# Patient Record
Sex: FEMALE | Race: BLACK OR AFRICAN AMERICAN | Employment: FULL TIME | URBAN - METROPOLITAN AREA
[De-identification: names, ages, dates, MRNs, and addresses within clinical notes are randomized per-mention and may not be internally consistent; named-entity substitution may affect disease eponyms.]

---

## 2017-07-18 PROBLEM — D21.9 LEIOMYOMA: Status: ACTIVE | Noted: 2017-07-18

## 2020-03-04 PROBLEM — I16.9 HYPERTENSIVE CRISIS: Status: ACTIVE | Noted: 2020-03-04

## 2021-04-17 ENCOUNTER — APPOINTMENT (OUTPATIENT)
Dept: CT IMAGING | Age: 41
DRG: 052 | End: 2021-04-17
Attending: EMERGENCY MEDICINE
Payer: MEDICAID

## 2021-04-17 ENCOUNTER — HOSPITAL ENCOUNTER (INPATIENT)
Age: 41
LOS: 2 days | Discharge: HOME | DRG: 052 | End: 2021-04-19
Attending: EMERGENCY MEDICINE | Admitting: STUDENT IN AN ORGANIZED HEALTH CARE EDUCATION/TRAINING PROGRAM
Payer: MEDICAID

## 2021-04-17 ENCOUNTER — APPOINTMENT (OUTPATIENT)
Dept: CT IMAGING | Age: 41
DRG: 052 | End: 2021-04-17
Attending: STUDENT IN AN ORGANIZED HEALTH CARE EDUCATION/TRAINING PROGRAM
Payer: MEDICAID

## 2021-04-17 DIAGNOSIS — I63.9 CEREBROVASCULAR ACCIDENT (CVA), UNSPECIFIED MECHANISM (HCC): Primary | ICD-10-CM

## 2021-04-17 LAB
ALBUMIN SERPL-MCNC: 3.1 G/DL (ref 3.4–5)
ALBUMIN/GLOB SERPL: 0.8 {RATIO} (ref 0.8–1.7)
ALP SERPL-CCNC: 103 U/L (ref 45–117)
ALT SERPL-CCNC: 56 U/L (ref 13–56)
ANION GAP SERPL CALC-SCNC: 3 MMOL/L (ref 3–18)
ANION GAP SERPL CALC-SCNC: 5 MMOL/L (ref 3–18)
AST SERPL-CCNC: 32 U/L (ref 10–38)
BASOPHILS # BLD: 0 K/UL (ref 0–0.1)
BASOPHILS NFR BLD: 0 % (ref 0–2)
BILIRUB SERPL-MCNC: 0.3 MG/DL (ref 0.2–1)
BUN SERPL-MCNC: 12 MG/DL (ref 7–18)
BUN SERPL-MCNC: 8 MG/DL (ref 7–18)
BUN/CREAT SERPL: 15 (ref 12–20)
BUN/CREAT SERPL: 25 (ref 12–20)
CALCIUM SERPL-MCNC: 9.2 MG/DL (ref 8.5–10.1)
CALCIUM SERPL-MCNC: 9.4 MG/DL (ref 8.5–10.1)
CHLORIDE SERPL-SCNC: 107 MMOL/L (ref 100–111)
CHLORIDE SERPL-SCNC: 109 MMOL/L (ref 100–111)
CO2 SERPL-SCNC: 25 MMOL/L (ref 21–32)
CO2 SERPL-SCNC: 28 MMOL/L (ref 21–32)
CREAT SERPL-MCNC: 0.48 MG/DL (ref 0.6–1.3)
CREAT SERPL-MCNC: 0.53 MG/DL (ref 0.6–1.3)
DIFFERENTIAL METHOD BLD: ABNORMAL
EOSINOPHIL # BLD: 0.3 K/UL (ref 0–0.4)
EOSINOPHIL NFR BLD: 3 % (ref 0–5)
ERYTHROCYTE [DISTWIDTH] IN BLOOD BY AUTOMATED COUNT: 18.3 % (ref 11.6–14.5)
ERYTHROCYTE [DISTWIDTH] IN BLOOD BY AUTOMATED COUNT: 18.6 % (ref 11.6–14.5)
GLOBULIN SER CALC-MCNC: 3.9 G/DL (ref 2–4)
GLUCOSE SERPL-MCNC: 101 MG/DL (ref 74–99)
GLUCOSE SERPL-MCNC: 90 MG/DL (ref 74–99)
HCG SERPL QL: NEGATIVE
HCT VFR BLD AUTO: 34.9 % (ref 35–45)
HCT VFR BLD AUTO: 37.5 % (ref 35–45)
HGB BLD-MCNC: 10.1 G/DL (ref 12–16)
HGB BLD-MCNC: 11.2 G/DL (ref 12–16)
INR PPP: 1 (ref 0.8–1.2)
LYMPHOCYTES # BLD: 3.6 K/UL (ref 0.9–3.6)
LYMPHOCYTES NFR BLD: 40 % (ref 21–52)
MCH RBC QN AUTO: 20.3 PG (ref 24–34)
MCH RBC QN AUTO: 20.7 PG (ref 24–34)
MCHC RBC AUTO-ENTMCNC: 28.9 G/DL (ref 31–37)
MCHC RBC AUTO-ENTMCNC: 29.9 G/DL (ref 31–37)
MCV RBC AUTO: 69.2 FL (ref 74–97)
MCV RBC AUTO: 70.2 FL (ref 74–97)
MONOCYTES # BLD: 0.6 K/UL (ref 0.05–1.2)
MONOCYTES NFR BLD: 7 % (ref 3–10)
NEUTS SEG # BLD: 4.5 K/UL (ref 1.8–8)
NEUTS SEG NFR BLD: 50 % (ref 40–73)
PHOSPHATE SERPL-MCNC: 5.2 MG/DL (ref 2.5–4.9)
PLATELET # BLD AUTO: 392 K/UL (ref 135–420)
PLATELET # BLD AUTO: 402 K/UL (ref 135–420)
PLATELET COMMENTS,PCOM: ABNORMAL
PMV BLD AUTO: 10 FL (ref 9.2–11.8)
PMV BLD AUTO: 9.9 FL (ref 9.2–11.8)
POTASSIUM SERPL-SCNC: 3.7 MMOL/L (ref 3.5–5.5)
POTASSIUM SERPL-SCNC: 3.9 MMOL/L (ref 3.5–5.5)
PROT SERPL-MCNC: 7 G/DL (ref 6.4–8.2)
PROTHROMBIN TIME: 13 SEC (ref 11.5–15.2)
RBC # BLD AUTO: 4.97 M/UL (ref 4.2–5.3)
RBC # BLD AUTO: 5.42 M/UL (ref 4.2–5.3)
RBC MORPH BLD: ABNORMAL
SODIUM SERPL-SCNC: 137 MMOL/L (ref 136–145)
SODIUM SERPL-SCNC: 140 MMOL/L (ref 136–145)
TROPONIN I SERPL-MCNC: <0.02 NG/ML (ref 0–0.04)
WBC # BLD AUTO: 9 K/UL (ref 4.6–13.2)
WBC # BLD AUTO: 9.3 K/UL (ref 4.6–13.2)

## 2021-04-17 PROCEDURE — 74011000250 HC RX REV CODE- 250: Performed by: EMERGENCY MEDICINE

## 2021-04-17 PROCEDURE — 85027 COMPLETE CBC AUTOMATED: CPT

## 2021-04-17 PROCEDURE — 84100 ASSAY OF PHOSPHORUS: CPT

## 2021-04-17 PROCEDURE — 70450 CT HEAD/BRAIN W/O DYE: CPT

## 2021-04-17 PROCEDURE — 70498 CT ANGIOGRAPHY NECK: CPT

## 2021-04-17 PROCEDURE — 74011000636 HC RX REV CODE- 636: Performed by: STUDENT IN AN ORGANIZED HEALTH CARE EDUCATION/TRAINING PROGRAM

## 2021-04-17 PROCEDURE — 65270000029 HC RM PRIVATE

## 2021-04-17 PROCEDURE — 80053 COMPREHEN METABOLIC PANEL: CPT

## 2021-04-17 PROCEDURE — 65610000006 HC RM INTENSIVE CARE

## 2021-04-17 PROCEDURE — 3E03317 INTRODUCTION OF OTHER THROMBOLYTIC INTO PERIPHERAL VEIN, PERCUTANEOUS APPROACH: ICD-10-PCS | Performed by: EMERGENCY MEDICINE

## 2021-04-17 PROCEDURE — 85025 COMPLETE CBC W/AUTO DIFF WBC: CPT

## 2021-04-17 PROCEDURE — 74011250636 HC RX REV CODE- 250/636: Performed by: EMERGENCY MEDICINE

## 2021-04-17 PROCEDURE — 93005 ELECTROCARDIOGRAM TRACING: CPT

## 2021-04-17 PROCEDURE — 85610 PROTHROMBIN TIME: CPT

## 2021-04-17 PROCEDURE — 84484 ASSAY OF TROPONIN QUANT: CPT

## 2021-04-17 PROCEDURE — 84703 CHORIONIC GONADOTROPIN ASSAY: CPT

## 2021-04-17 PROCEDURE — 37195 THROMBOLYTIC THERAPY STROKE: CPT

## 2021-04-17 PROCEDURE — 94762 N-INVAS EAR/PLS OXIMTRY CONT: CPT

## 2021-04-17 PROCEDURE — 74011250637 HC RX REV CODE- 250/637: Performed by: EMERGENCY MEDICINE

## 2021-04-17 PROCEDURE — 99285 EMERGENCY DEPT VISIT HI MDM: CPT

## 2021-04-17 RX ORDER — SODIUM CHLORIDE 0.9 % (FLUSH) 0.9 %
5-40 SYRINGE (ML) INJECTION EVERY 8 HOURS
Status: DISCONTINUED | OUTPATIENT
Start: 2021-04-17 | End: 2021-04-19 | Stop reason: HOSPADM

## 2021-04-17 RX ORDER — ATORVASTATIN CALCIUM 40 MG/1
80 TABLET, FILM COATED ORAL
Status: DISCONTINUED | OUTPATIENT
Start: 2021-04-17 | End: 2021-04-19 | Stop reason: HOSPADM

## 2021-04-17 RX ORDER — SODIUM CHLORIDE 0.9 % (FLUSH) 0.9 %
5-40 SYRINGE (ML) INJECTION AS NEEDED
Status: DISCONTINUED | OUTPATIENT
Start: 2021-04-17 | End: 2021-04-19 | Stop reason: HOSPADM

## 2021-04-17 RX ORDER — ACETAMINOPHEN 500 MG
1000 TABLET ORAL
Status: COMPLETED | OUTPATIENT
Start: 2021-04-17 | End: 2021-04-17

## 2021-04-17 RX ORDER — POLYETHYLENE GLYCOL 3350 17 G/17G
17 POWDER, FOR SOLUTION ORAL DAILY PRN
Status: DISCONTINUED | OUTPATIENT
Start: 2021-04-17 | End: 2021-04-19 | Stop reason: HOSPADM

## 2021-04-17 RX ORDER — DEXTROSE 50 % IN WATER (D50W) INTRAVENOUS SYRINGE
12.5
Status: ACTIVE | OUTPATIENT
Start: 2021-04-17 | End: 2021-04-18

## 2021-04-17 RX ORDER — ACETAMINOPHEN 325 MG/1
650 TABLET ORAL
Status: DISCONTINUED | OUTPATIENT
Start: 2021-04-17 | End: 2021-04-19 | Stop reason: HOSPADM

## 2021-04-17 RX ORDER — ACETAMINOPHEN 650 MG/1
650 SUPPOSITORY RECTAL
Status: DISCONTINUED | OUTPATIENT
Start: 2021-04-17 | End: 2021-04-19 | Stop reason: HOSPADM

## 2021-04-17 RX ADMIN — ALTEPLASE 9 MG: KIT at 18:36

## 2021-04-17 RX ADMIN — ALTEPLASE 81 MG: KIT at 18:38

## 2021-04-17 RX ADMIN — IOPAMIDOL 80 ML: 755 INJECTION, SOLUTION INTRAVENOUS at 20:58

## 2021-04-17 RX ADMIN — ACETAMINOPHEN 1000 MG: 500 TABLET ORAL at 19:30

## 2021-04-17 NOTE — Clinical Note
Status[de-identified] INPATIENT [101]   Type of Bed: Stepdown [17]   Inpatient Hospitalization Certified Necessary for the Following Reasons: 3.  Patient receiving treatment that can only be provided in an inpatient setting (further clarification in H&P documentation)   Admitting Diagnosis: CVA (cerebral vascular accident) [facility]) [[id number]]   Admitting Physician: Jose Alberto Guy [[id number]]   Attending Physician: Cassius Mohamud   Estimated Length of Stay: 3-4 Midnights   Discharge Plan[de-identified] Home with Office Follow-up

## 2021-04-17 NOTE — ED PROVIDER NOTES
EMERGENCY DEPARTMENT HISTORY AND PHYSICAL EXAM    5:47 PM crowding in the ER patient seen on EMS stretcher to expedite care      Date: 2021  Patient Name: Sarah Sousa    History of Presenting Illness     Chief Complaint   Patient presents with    Hypertension    Extremity Weakness         History Provided By: patient    Additional History (Context): Sarah Sousa is a 47 y.o. female presents with history of hypertension and noncompliant with her meds, about an hour and 15 minutes ago while eating crabs developed left upper extremity numbness, she has had this syndrome before also has blurry vision. She also notes headache. Peggy Thao PCP: Devera Hammans, DO    Chief Complaint:   Duration:    Timing:    Location:   Quality:   Severity:   Modifying Factors:   Associated Symptoms:       Current Outpatient Medications   Medication Sig Dispense Refill    amLODIPine (NORVASC) 10 mg tablet Take 1 Tab by mouth daily. 30 Tab 0    metoprolol tartrate (LOPRESSOR) 50 mg tablet Take 1 Tab by mouth every twelve (12) hours. 30 Tab 0       Past History     Past Medical History:  Past Medical History:   Diagnosis Date    Anemia, iron deficiency     Generalized headaches     Hypertension     NO CURRENT MEDS    Leiomyoma of uterus, unspecified        Past Surgical History:  Past Surgical History:   Procedure Laterality Date    HX  SECTION      times 2    HX CHOLECYSTECTOMY      HX TUBAL LIGATION  2009    IR OCCL TXCATH ORGAN W SI         Family History:  Family History   Problem Relation Age of Onset    Diabetes Father     Hypertension Father     Diabetes Maternal Grandmother        Social History:  Social History     Tobacco Use    Smoking status: Never Smoker    Smokeless tobacco: Never Used   Substance Use Topics    Alcohol use: Yes    Drug use: No       Allergies:  No Known Allergies      Review of Systems     Review of Systems   Constitutional: Negative for diaphoresis and fever.    HENT: Negative for congestion and sore throat. Eyes: Negative for pain and itching. Respiratory: Negative for cough and shortness of breath. Cardiovascular: Negative for chest pain and palpitations. Gastrointestinal: Negative for abdominal pain and diarrhea. Endocrine: Negative for polydipsia and polyuria. Genitourinary: Negative for dysuria and hematuria. Musculoskeletal: Negative for arthralgias and myalgias. Skin: Negative for rash and wound. Neurological: Positive for numbness. Negative for seizures and syncope. Hematological: Does not bruise/bleed easily. Psychiatric/Behavioral: Negative for agitation and hallucinations. Physical Exam       Patient Vitals for the past 12 hrs:   Temp Pulse Resp BP SpO2   04/17/21 1836    (!) 157/101    04/17/21 1757 98.3 °F (36.8 °C) 91 19 (!) 155/110 100 %       Physical Exam  Vitals signs and nursing note reviewed. Constitutional:       Appearance: She is well-developed. HENT:      Head: Normocephalic and atraumatic. Eyes:      General: No scleral icterus. Extraocular Movements: Extraocular movements intact. Conjunctiva/sclera: Conjunctivae normal.      Pupils: Pupils are equal, round, and reactive to light. Neck:      Musculoskeletal: Normal range of motion and neck supple. Vascular: No JVD. Cardiovascular:      Rate and Rhythm: Normal rate and regular rhythm. Heart sounds: Normal heart sounds. Comments: 4 intact extremity pulses  Pulmonary:      Effort: Pulmonary effort is normal.      Breath sounds: Normal breath sounds. Abdominal:      Palpations: Abdomen is soft. There is no mass. Tenderness: There is no abdominal tenderness. Musculoskeletal: Normal range of motion. Lymphadenopathy:      Cervical: No cervical adenopathy. Skin:     General: Skin is warm and dry. Neurological:      Mental Status: She is alert. Cranial Nerves: No cranial nerve deficit.            Diagnostic Study Results Labs -  Recent Results (from the past 12 hour(s))   CBC WITH AUTOMATED DIFF    Collection Time: 04/17/21  5:45 PM   Result Value Ref Range    WBC 9.0 4.6 - 13.2 K/uL    RBC 5.42 (H) 4.20 - 5.30 M/uL    HGB 11.2 (L) 12.0 - 16.0 g/dL    HCT 37.5 35.0 - 45.0 %    MCV 69.2 (L) 74.0 - 97.0 FL    MCH 20.7 (L) 24.0 - 34.0 PG    MCHC 29.9 (L) 31.0 - 37.0 g/dL    RDW 18.6 (H) 11.6 - 14.5 %    PLATELET 436 499 - 455 K/uL    MPV 9.9 9.2 - 11.8 FL    NEUTROPHILS PENDING %    LYMPHOCYTES PENDING %    MONOCYTES PENDING %    EOSINOPHILS PENDING %    BASOPHILS PENDING %    ABS. NEUTROPHILS PENDING K/UL    ABS. LYMPHOCYTES PENDING K/UL    ABS. MONOCYTES PENDING K/UL    ABS. EOSINOPHILS PENDING K/UL    ABS. BASOPHILS PENDING K/UL    DF PENDING    METABOLIC PANEL, BASIC    Collection Time: 04/17/21  5:45 PM   Result Value Ref Range    Sodium 140 136 - 145 mmol/L    Potassium 3.9 3.5 - 5.5 mmol/L    Chloride 109 100 - 111 mmol/L    CO2 28 21 - 32 mmol/L    Anion gap 3 3.0 - 18 mmol/L    Glucose 90 74 - 99 mg/dL    BUN 8 7.0 - 18 MG/DL    Creatinine 0.53 (L) 0.6 - 1.3 MG/DL    BUN/Creatinine ratio 15 12 - 20      GFR est AA >60 >60 ml/min/1.73m2    GFR est non-AA >60 >60 ml/min/1.73m2    Calcium 9.4 8.5 - 10.1 MG/DL   PROTHROMBIN TIME + INR    Collection Time: 04/17/21  5:45 PM   Result Value Ref Range    Prothrombin time 13.0 11.5 - 15.2 sec    INR 1.0 0.8 - 1.2     TROPONIN I    Collection Time: 04/17/21  5:45 PM   Result Value Ref Range    Troponin-I, QT <0.02 0.0 - 0.045 NG/ML       Radiologic Studies -   CT HEAD WO CONT   Final Result   No intracranial hemorrhage or mass effect. Findings discussed with Dr. Robin Molina at 6:05 PM April 17, 2021 by telephone. Ct Head Wo Cont    Result Date: 4/17/2021  CT HEAD WO CONT: 4/17/2021 5:52 PM CLINICAL INFORMATION Left upper extremity weakness. COMPARISON None. TECHNIQUE Axial CT images of the brain acquired. Sagittal and coronal reformations performed.  The following CT dose reduction techniques were utilized: automated exposure control and/or adjustment of the mA and/or kV according to patient size, and the use of iterative reconstruction technique FINDINGS INTRA-AXIAL STRUCTURES Cerebral cortex: No evidence of intracranial mass or hemorrhage. The gray-white differentiation is maintained. Normal attenuation of the cerebral cortex. Normal gray-white differentiation. Ventricular system: Normal in size and morphology for the patient's age. Midline shift: No evidence of midline shift. Sella: The sella turcica and hypothalamic region are within normal limits. Cerebellum: The cerebellum is normal in appearance. Normal gray-white differentiation. Brainstem: The brainstem is normal in appearance. The prepontine cistern is within normal limits. Normal gray-white differentiation. EXTRA-AXIAL STRUCTURES Normal in size and morphology for the patient's age. VASCULATURE Normal. EXTRACRANIAL SOFT TISSUE STRUCTURES Orbits: The orbits and globes are within normal limits. Sinuses: Patent paranasal sinuses and mastoid air cells bilaterally. Visualized upper cervical spine: Normal. OSSEOUS STRUCTURES No evidence of acute fracture. No intracranial hemorrhage or mass effect. Findings discussed with Dr. Grazyna Meyer at 6:05 PM April 17, 2021 by telephone. Medications ordered:   Medications   alteplase (ACTIVASE) bolus dose (0 mg IntraVENous IV Completed 4/17/21 1837)   alteplase (ACTIVASE) infusion 81 mg (81 mg IntraVENous Given 4/17/21 1838)         Medical Decision Making   Initial Medical Decision Making and DDx:   possible stroke, migraine, hypertensive encephalopathy less likely with a pressure 150, pseudotumor. ED Course: Progress Notes, Reevaluation, and Consults:    5:49 PM d/w Dr Gerhardt Simmers teleneurology discussed: 15 minutes of symptoms, left upper extremity numbness blurry vision and some of her strength or control problems with her left upper extremity.   ED Course as of Apr 17 1840   Sat Apr 17, 2021   1805 Appreciate call back from radiology, nothing acute on head CT.    [CB]   1815 D/w Dr Rommel Harden, teleneurologist, he saw and evaluated the patient reviewed films, noted some mild hypotension, thinks may be a a superficial right parietal area stroke could cause this and patient is low risk of giving TPA so he recommends that. He called back, had informed discussion with the patient and family and the patient accepts the TPA. I have entered orders. [CB]   0848 Discussed with Dr. Meza Leader intensivist to admit the patient since she is getting TPA. Dr. Meza Leader at bedside to evaluate. 1.5 hour critical care time management of acute stroke coordination of care data analysis documentation reassessment. [CB]      ED Course User Index  [CB] Chris Cedeño MD     6:42 PM reassessed the patient, will encroachment his good function of the left upper extremity. She is hesitant to do full active range of motion but does with assistance. Good muscle tone. I am the first provider for this patient. I reviewed the vital signs, available nursing notes, past medical history, past surgical history, family history and social history. Patient Vitals for the past 12 hrs:   Temp Pulse Resp BP SpO2   04/17/21 1836    (!) 157/101    04/17/21 1757 98.3 °F (36.8 °C) 91 19 (!) 155/110 100 %       Vital Signs-Reviewed the patient's vital signs. Pulse Oximetry Analysis, Cardiac Monitor, 12 lead ekg: No hypoxia on room air  Interpreted by the EP. Records Reviewed: Nursing notes reviewed (Time of Review: 5:47 PM)    Procedures:   Critical Care Time:   Aspirin: (was aspirin given for stroke?)    Diagnosis     Clinical Impression:   1.  Cerebrovascular accident (CVA), unspecified mechanism ([facility] [address]        Disposition: Admitted      Follow-up Information    None          Patient's Medications   Start Taking    No medications on file   Continue Taking    AMLODIPINE (NORVASC) 10 MG TABLET    Take 1 Tab by mouth daily. METOPROLOL TARTRATE (LOPRESSOR) 50 MG TABLET    Take 1 Tab by mouth every twelve (12) hours.    These Medications have changed    No medications on file   Stop Taking    No medications on file     _______________________________    Notes:    Siria Lobo MD using Dragon dictation      _______________________________

## 2021-04-18 ENCOUNTER — APPOINTMENT (OUTPATIENT)
Dept: CT IMAGING | Age: 41
DRG: 052 | End: 2021-04-18
Attending: REGISTERED NURSE
Payer: MEDICAID

## 2021-04-18 LAB
ALBUMIN SERPL-MCNC: 3.2 G/DL (ref 3.4–5)
ALBUMIN/GLOB SERPL: 0.8 {RATIO} (ref 0.8–1.7)
ALP SERPL-CCNC: 108 U/L (ref 45–117)
ALT SERPL-CCNC: 54 U/L (ref 13–56)
ANION GAP SERPL CALC-SCNC: 3 MMOL/L (ref 3–18)
AST SERPL-CCNC: 27 U/L (ref 10–38)
ATRIAL RATE: 95 BPM
BILIRUB SERPL-MCNC: 0.3 MG/DL (ref 0.2–1)
BUN SERPL-MCNC: 9 MG/DL (ref 7–18)
BUN/CREAT SERPL: 20 (ref 12–20)
CALCIUM SERPL-MCNC: 9.5 MG/DL (ref 8.5–10.1)
CALCULATED P AXIS, ECG09: 27 DEGREES
CALCULATED R AXIS, ECG10: 61 DEGREES
CALCULATED T AXIS, ECG11: 29 DEGREES
CHLORIDE SERPL-SCNC: 110 MMOL/L (ref 100–111)
CO2 SERPL-SCNC: 25 MMOL/L (ref 21–32)
CREAT SERPL-MCNC: 0.44 MG/DL (ref 0.6–1.3)
DIAGNOSIS, 93000: NORMAL
ERYTHROCYTE [DISTWIDTH] IN BLOOD BY AUTOMATED COUNT: 18.1 % (ref 11.6–14.5)
FERRITIN SERPL-MCNC: 34 NG/ML (ref 8–388)
GLOBULIN SER CALC-MCNC: 4.2 G/DL (ref 2–4)
GLUCOSE SERPL-MCNC: 115 MG/DL (ref 74–99)
HCT VFR BLD AUTO: 34.7 % (ref 35–45)
HGB BLD-MCNC: 10.6 G/DL (ref 12–16)
IRON SATN MFR SERPL: 10 % (ref 20–50)
IRON SERPL-MCNC: 38 UG/DL (ref 50–175)
MAGNESIUM SERPL-MCNC: 1.9 MG/DL (ref 1.6–2.6)
MCH RBC QN AUTO: 21 PG (ref 24–34)
MCHC RBC AUTO-ENTMCNC: 30.5 G/DL (ref 31–37)
MCV RBC AUTO: 68.7 FL (ref 74–97)
P-R INTERVAL, ECG05: 154 MS
PHOSPHATE SERPL-MCNC: 3.5 MG/DL (ref 2.5–4.9)
PLATELET # BLD AUTO: 377 K/UL (ref 135–420)
PMV BLD AUTO: 10 FL (ref 9.2–11.8)
POTASSIUM SERPL-SCNC: 3.7 MMOL/L (ref 3.5–5.5)
PROT SERPL-MCNC: 7.4 G/DL (ref 6.4–8.2)
Q-T INTERVAL, ECG07: 338 MS
QRS DURATION, ECG06: 72 MS
QTC CALCULATION (BEZET), ECG08: 424 MS
RBC # BLD AUTO: 5.05 M/UL (ref 4.2–5.3)
SODIUM SERPL-SCNC: 138 MMOL/L (ref 136–145)
TIBC SERPL-MCNC: 374 UG/DL (ref 250–450)
VENTRICULAR RATE, ECG03: 95 BPM
WBC # BLD AUTO: 8.9 K/UL (ref 4.6–13.2)

## 2021-04-18 PROCEDURE — 70450 CT HEAD/BRAIN W/O DYE: CPT

## 2021-04-18 PROCEDURE — 74011000250 HC RX REV CODE- 250: Performed by: STUDENT IN AN ORGANIZED HEALTH CARE EDUCATION/TRAINING PROGRAM

## 2021-04-18 PROCEDURE — APPSS30 APP SPLIT SHARED TIME 16-30 MINUTES: Performed by: REGISTERED NURSE

## 2021-04-18 PROCEDURE — 83735 ASSAY OF MAGNESIUM: CPT

## 2021-04-18 PROCEDURE — 80053 COMPREHEN METABOLIC PANEL: CPT

## 2021-04-18 PROCEDURE — 85027 COMPLETE CBC AUTOMATED: CPT

## 2021-04-18 PROCEDURE — 99221 1ST HOSP IP/OBS SF/LOW 40: CPT | Performed by: PSYCHIATRY & NEUROLOGY

## 2021-04-18 PROCEDURE — 84100 ASSAY OF PHOSPHORUS: CPT

## 2021-04-18 PROCEDURE — 74011250637 HC RX REV CODE- 250/637: Performed by: STUDENT IN AN ORGANIZED HEALTH CARE EDUCATION/TRAINING PROGRAM

## 2021-04-18 PROCEDURE — 92610 EVALUATE SWALLOWING FUNCTION: CPT

## 2021-04-18 PROCEDURE — 82728 ASSAY OF FERRITIN: CPT

## 2021-04-18 PROCEDURE — 83550 IRON BINDING TEST: CPT

## 2021-04-18 PROCEDURE — 65610000006 HC RM INTENSIVE CARE

## 2021-04-18 PROCEDURE — APPSS30 APP SPLIT SHARED TIME 16-30 MINUTES: Performed by: NURSE PRACTITIONER

## 2021-04-18 RX ORDER — LABETALOL HCL 20 MG/4 ML
10 SYRINGE (ML) INTRAVENOUS
Status: DISCONTINUED | OUTPATIENT
Start: 2021-04-18 | End: 2021-04-19 | Stop reason: HOSPADM

## 2021-04-18 RX ORDER — METOPROLOL TARTRATE 5 MG/5ML
10 INJECTION INTRAVENOUS
Status: DISCONTINUED | OUTPATIENT
Start: 2021-04-18 | End: 2021-04-18

## 2021-04-18 RX ADMIN — Medication 10 ML: at 14:00

## 2021-04-18 RX ADMIN — METOROPROLOL TARTRATE 10 MG: 5 INJECTION, SOLUTION INTRAVENOUS at 17:55

## 2021-04-18 RX ADMIN — ACETAMINOPHEN 650 MG: 325 TABLET ORAL at 13:48

## 2021-04-18 NOTE — H&P
[facility]  Pulmonary, Critical Care, and Sleep Medicine    Name: Felipe Norwood MRN: [id number]   : 1980 Hospital: [address] [place] [address] Dr   Date: 2021        Critical Care History and Physical      IMPRESSION:   · CVA s/p TPA 17APR at 1830  · History of COVID-19 pneumonia, 2021  · Hypertension  · Obesity     Patient Active Problem List   Diagnosis Code    Leiomyoma D21.9    Hypertensive crisis I16.9    CVA (cerebral vascular accident) ([address] I63.9        RECOMMENDATIONS:   · Resp: Titrate supp O2 for SpO2 >92%;   · I/D: Afebrile; no leukocytosis. · Hem/Onc: Daily CBC; H/H, and plts are stable  · CVS: HD stable. Permissive hypertension post-CVA. Pending ECHO with bubble study. Starting statin. · Metabolic: Daily BMP; monitor e-lytes; replace PRN  · Renal: Trend renal indices. · Endocrine: POC Glucose q6. · GI: NPO   · Musc/Skin: No acute issues, wound care  · OB/GYN: HCG negative  · Neuro: Neuro checks per post-TPA protocol, q1h. CTA head and neck pending. MRI pending. Will need repeat CT head in 24 hours, at 1830 on 18APR. · Fluids: none  · Restraints renewal: Not currently indicated  · Code Status: Full code. Brother Juliette Daily is her medical POA if she were to become unable to make her own medical decisions; phone number [phone]. Alternate point of contact (not POA), shiloh Chen, phone number [phone]. Best Practice:  · Sepsis Bundle per Hospital Protocol  · Glycemic control; avoid Hypoglycemia  · IHI ICU Bundles:  ·  Crooks Bundle Followed    · Stress ulcer prophylaxis: not indicated  · DVT prophylaxis: SCDs post-TPA  · Need for Lines, crooks assessed. · Restraints not need. Subjective/History:      This patient has been seen and evaluated at the request of Dr. Selene Dickerson in the ER for CVA post TPA.    21    Patient is a 47 y.o. female with pmh significant for hypertension, not currently adherent to her home BP medications, who presented to the ER today with approx 1.5 hours of acute onset left arm weakness and numbness. She also reports slight blurring of her vision. No other neurologic symptoms, no headache. She states that she was in her usual state of health up until 1.5h prior to arrival. She is not on any OCPs, not pregnant, and has no known history of afib or clots. She did have COVID-19 pneumonia in Feb, but currently denies any persistent symptoms. Past Medical History:   Diagnosis Date    Anemia, iron deficiency     Generalized headaches     Hypertension     NO CURRENT MEDS    Leiomyoma of uterus, unspecified         Past Surgical History:   Procedure Laterality Date    HX  SECTION      times 2    HX CHOLECYSTECTOMY      HX TUBAL LIGATION      IR OCCL TXCATH ORGAN W SI          Prior to Admission medications    Medication Sig Start Date End Date Taking? Authorizing Provider   amLODIPine (NORVASC) 10 mg tablet Take 1 Tab by mouth daily. 3/7/20   Jono Bell MD   metoprolol tartrate (LOPRESSOR) 50 mg tablet Take 1 Tab by mouth every twelve (12) hours. 3/6/20   Jono Bell MD       Current Facility-Administered Medications   Medication Dose Route Frequency    sodium chloride (NS) flush 5-40 mL  5-40 mL IntraVENous Q8H    atorvastatin (LIPITOR) tablet 80 mg  80 mg Oral QHS       No Known Allergies     Social History     Tobacco Use    Smoking status: Never Smoker    Smokeless tobacco: Never Used   Substance Use Topics    Alcohol use: Yes        Family History   Problem Relation Age of Onset    Diabetes Father     Hypertension Father     Diabetes Maternal Grandmother           Review of Systems:  A comprehensive review of systems was negative except for that written in the HPI.     Objective:   Vital Signs:    Visit Vitals  BP (!) 158/90   Pulse 89   Temp 98.3 °F (36.8 °C)   Resp 23   Ht 5' 7\" (1.702 m)   Wt 134.3 kg (296 lb)   SpO2 100%   BMI 46.36 kg/m²       O2 Device: None (Room air)       Temp (24hrs), Av.3 °F (36.8 °C), Min:98.3 °F (36.8 °C), Max:98.3 °F (36.8 °C)       Intake/Output:   Last shift:      No intake/output data recorded. Last 3 shifts: No intake/output data recorded. No intake or output data in the 24 hours ending 21    Physical Exam:     General:  Alert, cooperative, NAD   Head:  Normocephalic, without obvious abnormality, atraumatic. Eyes:  Conjunctivae/corneas clear. PERRL. Nose: Nasolabial fold minimally flattened on the left. Septum midline. Mucosa normal. No drainage or sinus tenderness. Throat: Lips, mucosa, and tongue normal. Teeth and gums normal.   Neck: Supple, symmetrical, trachea midline, no adenopathy, no carotid bruit and no JVD. Lungs:   Symmetrical chest rise; good AE bilat; CTAB; no wheezes/rhonchi/rales noted. Heart:  RRR, S1, S2 normal, no m/r/g   Abdomen:   Soft, non-tender. Bowel sounds normal. No masses,  No organomegaly. Extremities: Extremities normal, atraumatic, no cyanosis or edema. Pulses: 2+ and symmetric all extremities. Skin: Skin color, texture, turgor normal. No rashes or lesions   Neurologic: Nasolabial fold minimally flattened on the left; otherwise no other apparent CN deficits. 4/5 strength LUE; 5/5 in RUE, RLE, LLE, sensation intact to light touch throughout    Devices: PIV       Data:     Recent Results (from the past 24 hour(s))   CBC WITH AUTOMATED DIFF    Collection Time: 21  5:45 PM   Result Value Ref Range    WBC 9.0 4.6 - 13.2 K/uL    RBC 5.42 (H) 4.20 - 5.30 M/uL    HGB 11.2 (L) 12.0 - 16.0 g/dL    HCT 37.5 35.0 - 45.0 %    MCV 69.2 (L) 74.0 - 97.0 FL    MCH 20.7 (L) 24.0 - 34.0 PG    MCHC 29.9 (L) 31.0 - 37.0 g/dL    RDW 18.6 (H) 11.6 - 14.5 %    PLATELET 113 218 - 341 K/uL    MPV 9.9 9.2 - 11.8 FL    NEUTROPHILS 50 40 - 73 %    LYMPHOCYTES 40 21 - 52 %    MONOCYTES 7 3 - 10 %    EOSINOPHILS 3 0 - 5 %    BASOPHILS 0 0 - 2 %    ABS. NEUTROPHILS 4.5 1.8 - 8.0 K/UL    ABS.  LYMPHOCYTES 3.6 0.9 - 3.6 K/UL    ABS. MONOCYTES 0.6 0.05 - 1.2 K/UL    ABS. EOSINOPHILS 0.3 0.0 - 0.4 K/UL    ABS. BASOPHILS 0.0 0.0 - 0.1 K/UL    DF SMEAR SCANNED      PLATELET COMMENTS ADEQUATE PLATELETS      RBC COMMENTS ANISOCYTOSIS  1+        RBC COMMENTS HYPOCHROMIA  1+        RBC COMMENTS TEARDROP CELLS  1+       METABOLIC PANEL, BASIC    Collection Time: 04/17/21  5:45 PM   Result Value Ref Range    Sodium 140 136 - 145 mmol/L    Potassium 3.9 3.5 - 5.5 mmol/L    Chloride 109 100 - 111 mmol/L    CO2 28 21 - 32 mmol/L    Anion gap 3 3.0 - 18 mmol/L    Glucose 90 74 - 99 mg/dL    BUN 8 7.0 - 18 MG/DL    Creatinine 0.53 (L) 0.6 - 1.3 MG/DL    BUN/Creatinine ratio 15 12 - 20      GFR est AA >60 >60 ml/min/1.73m2    GFR est non-AA >60 >60 ml/min/1.73m2    Calcium 9.4 8.5 - 10.1 MG/DL   PROTHROMBIN TIME + INR    Collection Time: 04/17/21  5:45 PM   Result Value Ref Range    Prothrombin time 13.0 11.5 - 15.2 sec    INR 1.0 0.8 - 1.2     TROPONIN I    Collection Time: 04/17/21  5:45 PM   Result Value Ref Range    Troponin-I, QT <0.02 0.0 - 0.045 NG/ML   HCG QL SERUM    Collection Time: 04/17/21  5:45 PM   Result Value Ref Range    HCG, Ql. Negative NEG     EKG, 12 LEAD, INITIAL    Collection Time: 04/17/21  6:18 PM   Result Value Ref Range    Ventricular Rate 95 BPM    Atrial Rate 95 BPM    P-R Interval 154 ms    QRS Duration 72 ms    Q-T Interval 338 ms    QTC Calculation (Bezet) 424 ms    Calculated P Axis 27 degrees    Calculated R Axis 61 degrees    Calculated T Axis 29 degrees    Diagnosis       Normal sinus rhythm  Normal ECG  No previous ECGs available             No results for input(s): FIO2I, IFO2, HCO3I, IHCO3, HCOPOC, PCO2I, PCOPOC, IPHI, PHI, PHPOC, PO2I, PO2POC in the last 72 hours.     No lab exists for component: IPOC2    Telemetry:normal sinus rhythm    Imaging:  I have personally reviewed the patients radiographs and have reviewed the reports:    CXR [date]:  Results from Hospital Encounter encounter on 02/24/21   XR CHEST SNGL V    Narrative INDICATION:  chest pain       EXAMINATION:  XR CHEST SNGL V    COMPARISON:  3/4/2020    FINDINGS:  The study shows a normal sized heart. . Increased airspace opacity at the left  lower lobe. .          Impression IMPRESSION:  Left lower lobe infiltrate. CT CHEST/ABD/PELV/HEAD [date]:  Results from Hospital Encounter encounter on 04/17/21   CT HEAD WO CONT    Narrative CT HEAD WO CONT: 4/17/2021 5:52 PM    CLINICAL INFORMATION  Left upper extremity weakness. COMPARISON  None. TECHNIQUE  Axial CT images of the brain acquired. Sagittal and coronal reformations  performed. The following CT dose reduction techniques were utilized: automated exposure  control and/or adjustment of the mA and/or kV according to patient size, and the  use of iterative reconstruction technique     FINDINGS   INTRA-AXIAL STRUCTURES  Cerebral cortex: No evidence of intracranial mass or hemorrhage. The gray-white  differentiation is maintained. Normal attenuation of the cerebral cortex. Normal  gray-white differentiation. Ventricular system: Normal in size and morphology for the patient's age. Midline shift: No evidence of midline shift. Sella: The sella turcica and hypothalamic region are within normal limits. Cerebellum: The cerebellum is normal in appearance. Normal gray-white  differentiation. Brainstem: The brainstem is normal in appearance. The prepontine cistern is  within normal limits. Normal gray-white differentiation. EXTRA-AXIAL STRUCTURES  Normal in size and morphology for the patient's age. VASCULATURE  Normal.    EXTRACRANIAL SOFT TISSUE STRUCTURES  Orbits: The orbits and globes are within normal limits. Sinuses: Patent paranasal sinuses and mastoid air cells bilaterally. Visualized upper cervical spine: Normal.    OSSEOUS STRUCTURES  No evidence of acute fracture. Impression No intracranial hemorrhage or mass effect.     Findings discussed with  Joce at 6:05 PM April 17, 2021 by telephone. Total of  45  min critical care time spent at bedside during the course of care providing evaluation,management and care decisions and ordering appropriate treatment related to critical care problems exclusive of procedures. The reason for providing this level of medical care for this critically ill patient was due a critical illness that impaired one or more vital organ systems such that there was a high probability of imminent or life threatening deterioration in the patients condition. This care involved high complexity decision making to assess, manipulate, and support vital system functions, to treat this degree vital organ system failure and to prevent further life threatening deterioration of the patients condition.     Allison De Leon DO    04/17/21       Pulmonary Critical Care Medicine  [address] [place] [address] [facility]

## 2021-04-18 NOTE — ED NOTES
Diaystolic blood pressure running over 110 at times, Spoke with Dr Daryle Collin, received orders for prn medication

## 2021-04-18 NOTE — PROGRESS NOTES
PT orders received and chart reviewed. Pt receiving TPA 4/17 at 1830. Pt currently with COMPLETE BEDREST order, please discontinue for full participation in PT evaluation. [id number], Per physcian hold PT eval and removal of bedrest until after CT scan.     Thank you for this referral.   Charlie Wright PT DPT

## 2021-04-18 NOTE — PROGRESS NOTES
Problem: Dysphagia (Adult)  Goal: *Acute Goals and Plan of Care (Insert Text)  Description: Dysphagia Present:   No    Recommendations:  Diet: Regular/thin liquid  Meds: Per patient preference    Patient will:  1. Participate in training and education related to continued aspiration risk, diet recs and compensatory strategies (goal met). Outcome: Resolved/Met    SPEECH LANGUAGE PATHOLOGY BEDSIDE SWALLOW EVALUATION AND DISCHARGE    Patient: Phoenix Chacon (63 y.o. female)  Date: 2021  Primary Diagnosis: CVA (cerebral vascular accident) ([facility] [address] [I63.9]        Precautions: None     PLOF: Independent    ASSESSMENT :  Clinical beside swallow eval completed per MD orders. Pt A&Ox4. Functional communication. Intelligibility >90%. Cognitive-linguistic function appears intact. OM examination revealed oral motor structures functional for mastication and deglutition. Presented with thin liquid, puree, and solid trials. Exhibited + bolus cohesion, manipulation and A-P transit. Further exhibited + swallow timing/reflex and hyolaryngeal excursion. Pt able to manipulate and clear with 0 clinical s/s aspiration and/or oropharyngeal dysphagia. Pt safe for regular solid, thin liquid diet. 0 formal ST needs for dysphagia indicated at this time. SLP educated pt on role of speech therapist in current setting with re: speech/swallow; verbalized comprehension. SLP available for re-evaluation if indicated by MD.     Thank you for this referral.   Doris Rankin, SLP     PLAN :  Recommendations and Planned Interventions:  No formal ST needs ID'd for dysphagia. Eval only. Discharge Recommendations: None     SUBJECTIVE:   Patient stated I don't know what happened.     OBJECTIVE:     Past Medical History:   Diagnosis Date    Anemia, iron deficiency     Generalized headaches     Hypertension     NO CURRENT MEDS    Leiomyoma of uterus, unspecified      Past Surgical History:   Procedure Laterality Date    HX  SECTION times 2    HX CHOLECYSTECTOMY      HX TUBAL LIGATION  2009    IR OCCL TXCATH ORGAN W SI       Home Situation:     Diet prior to admission: Regular/thin liquid   Current Diet:  Regular/thin liquid      Cognitive and Communication Status:  Neurologic State: Alert, Eyes open spontaneously  Orientation Level: Oriented X4  Cognition: Appropriate decision making, Follows commands  Perception: Appears intact  Perseveration: No perseveration noted  Safety/Judgement: Awareness of environment, Good awareness of safety precautions  Oral Assessment:  Oral Assessment  Labial: No impairment  Dentition: Natural;Intact  Oral Hygiene: Good  Lingual: No impairment  Velum: No impairment  Mandible: No impairment  P.O. Trials:  Patient Position: [facility]  Vocal quality prior to P.O.: No impairment  Consistency Presented: Thin liquid;Puree; Solid  How Presented: Self-fed/presented;Straw;Successive swallows     Bolus Acceptance: No impairment  Bolus Formation/Control: No impairment     Propulsion: No impairment  Oral Residue: None  Initiation of Swallow: No impairment  Laryngeal Elevation: Functional  Aspiration Signs/Symptoms: None  Pharyngeal Phase Characteristics: No impairment, issues, or problems      Cues for Modifications: None       Oral Phase Severity: No impairment  Pharyngeal Phase Severity : No impairment    PAIN:  Pain level pre-treatment: 0/10   Pain level post-treatment: 0/10     After evaluation:   []            Patient left in no apparent distress sitting up in chair  [x]            Patient left in no apparent distress in bed  [x]            Call bell left within reach  [x]            Nursing notified  []            Family present  []            Caregiver present  []            Bed alarm activated      COMMUNICATION/EDUCATION:   [x]            Aspiration precautions; swallow safety; compensatory techniques. [x]            Patient/family have participated as able in goal setting and plan of care.   [x] Patient/family agree to work toward stated goals and plan of care. []            Patient understands intent and goals of therapy; neutral about participation. []            Patient unable to participate in goal setting/plan of care; educ ongoing with interdisciplinary staff  []         Posted safety precautions in patient's room.     Thank you for this referral.  Satya Medina, SLP  Time Calculation: 15 mins

## 2021-04-18 NOTE — ED NOTES
Assume care of patient, patient alert and oriented x 4, skin warm and dry, patient with decreased  on left, no drift, POC discussed with patient, patient on monitor - NSR noted at this time, will continue to monitor.   Purposeful rounding completed:    Side rails up x 1:  YES  Bed in low position and wheels locked: YES  Call bell within reach: YES  Comfort addressed: YES    Toileting needs addressed: YES  Plan of care reviewed/updated with patient and or family members: YES  IV site assessed: YES  Pain assessed and addressed: YES, 0

## 2021-04-18 NOTE — ED NOTES
Patient eating lunch.   Purposeful rounding completed:    Side rails up x 1:  YES  Bed in low position and wheels locked: YES  Call bell within reach: YES  Comfort addressed: YES    Toileting needs addressed: YES  Plan of care reviewed/updated with patient and or family members: YES  IV site assessed: YES  Pain assessed and addressed: YES, 0

## 2021-04-18 NOTE — PROGRESS NOTES
Pulmonary / Critical Care Physician:    Attending Note:  I saw and evaluated the patient, performing the key elements of the service. I discussed the findings, assessment and plan with the APC and agree with the findings and plan as documented in the note. Chart and note reviewed. Data reviewed personally including imaging studies and pertinent diagnostics, procedural data and consultant recommendations. In brief my findings, evaluation and recommendations are as stated below:    46 y/o female with history of hypertension nonadherent to medications who presented to the ER with LUE weakness, numbness, and blurry vision. Patient received TPA at 31 75 62 on 17APR for acute CVA. Currently has improvement in her LUE weakness and sensation and vision. CTA with patent vessels. Repeat CT head with no hemorrhage. She is stable for transfer to neuro floor. Rest of details and diagnostic/treatment plans per APC note. Total of  30 min critical care time spent at bedside during the course of care providing evaluation,management and care decisions and ordering appropriate treatment related to critical care problems exclusive of procedures. The reason for providing this level of medical care for this critically ill patient was due a critical illness that impaired one or more vital organ systems such that there was a high probability of imminent or life threatening deterioration in the patients condition. This care involved high complexity decision making to assess, manipulate, and support vital system functions, to treat this degree vital organ system failure and to prevent further life threatening deterioration of the patients condition. This time was independent of other practitioners. Chas Ospina DO  7:49 PM  4/18/2021        _______________________________________________________________________________________      [facility].   Pulmonary, Critical Care, and Sleep Medicine    Name: Rodolfo Delgadillo MRN: [id number]   : 1980 Hospital: [facility]   Date: 2021  Admission Date: 2021     Chart and notes reviewed. Data reviewed. I have evaluated all findings. [x]I have reviewed the flowsheet and previous days notes. []The patient is unable to give any meaningful history or review of systems because the patient is:  []Intubated []Sedated   []Unresponsive      []The patient is critically ill on      []Mechanical ventilation []Pressors   []BiPAP []         Interval HPI:  Patient is a 47 y.o. female presented with CC of left upper extremity weakness, blurred vision and headache while she was eating lunch, last known normal was an hour and 15 minutes form presentation. Pt's PMHx pertinent of hypertension. Pt admited to not taking her blood pressure medication since her trip to [facility] 1 week ago. Pt denies shortness of breath, chest pain, sick contacts. Code stroke was called and TPA was given per neurology recommendation. Subjective 21  Hospital Day:2  Overnight events:post TPA  Mentation/Activity: Pt. Easy to arouse, denies headache, shortness of breath, chest pain  Respiratory/ Secretions:Room air  [id number]:663E to 293Y systolic  Urine output, bowel:Voids without difficulty   Diet:NPO                ROS:Pertinent items are noted in HPI. Events and notes from last 24 hours reviewed. Care plan discussed on multidisciplinary rounds. Patient Active Problem List   Diagnosis Code    Leiomyoma D21.9    Hypertensive crisis I16.9    CVA (cerebral vascular accident) ([facility] [address] I63.9       Vital Signs:  Visit Vitals  BP (!) 146/90   Pulse 80   Temp 98.4 °F (36.9 °C)   Resp 15   Ht 5' 7\" (1.702 m)   Wt 134.3 kg (296 lb)   SpO2 99%   BMI 46.36 kg/m²       O2 Device: None (Room air)       Temp (24hrs), Av.4 °F (36.9 °C), Min:98.3 °F (36.8 °C), Max:98.4 °F (36.9 °C)       Intake/Output:   Last shift:      No intake/output data recorded.   Last 3 shifts: No intake/output data recorded. No intake or output data in the 24 hours ending 04/18/21 1222                     Current Facility-Administered Medications   Medication Dose Route Frequency    sodium chloride (NS) flush 5-40 mL  5-40 mL IntraVENous Q8H    atorvastatin (LIPITOR) tablet 80 mg  80 mg Oral QHS         Telemetry: []Sinus []A-flutter []Paced    []A-fib []Multiple PVCs                  Physical Exam:      General:  Alert, cooperative, no distress. Head:  Normocephalic, without obvious abnormality, atraumatic. Eyes:  Conjunctivae/corneas clear. PERRL,   Nose: Nares normal. Septum midline. Mucosa normal. No drainage or sinus tenderness. Throat: Lips, mucosa, and tongue normal. Teeth and gums normal.   Neck: Supple, symmetrical, trachea midline, no adenopathy, thyroid: no enlargment/tenderness/nodules, no carotid bruit and no JVD. Back:   Symmetric, no curvature. ROM normal.   Lungs:   Clear to auscultation bilaterally. Chest wall:  No tenderness or deformity. Heart:  Regular rate and rhythm, S1, S2 normal, no murmur, click, rub or gallop. Abdomen:   Soft, non-tender. Bowel sounds normal. No masses,  No organomegaly. Extremities: Left upper extremity weakness, positive drift, sensation intact, atraumatic, no cyanosis or edema. Pulses: 2+ and symmetric all extremities.    Skin: Skin color, texture, turgor normal. No rashes or lesions   Lymph nodes:       Cervical, supraclavicular, and axillary nodes normal.   Neurologic: AA &O x 4, PERRLA, no facial assymetry, sensation intact, speech intact        Devices:  No NGT/OGT, Central line/ PICC, ETT/tracheostomy, chest tube      DATA:  MAR reviewed and pertinent medications noted or modified as needed    Labs:  Recent Labs     04/18/21  0420 04/17/21 2133 04/17/21  1745   WBC 8.9 9.3 9.0   HGB 10.6* 10.1* 11.2*   HCT 34.7* 34.9* 37.5    392 402     Recent Labs     04/18/21  0420 04/17/21 2133 04/17/21  1745    137 140   K 3.7 3.7 3.9    107 109   CO2 25 25 28   * 101* 90   BUN 9 12 8   CREA 0.44* 0.48* 0.53*   CA 9.5 9.2 9.4   MG 1.9  --   --    PHOS 3.5 5.2*  --    ALB 3.2* 3.1*  --    ALT 54 56  --    INR  --   --  1.0     No results for input(s): PH, PCO2, PO2, HCO3, FIO2 in the last 72 hours. No results for input(s): FIO2I, IFO2, HCO3I, IHCO3, HCOPOC, PCO2I, PCOPOC, IPHI, PHI, PHPOC, PO2I, PO2POC in the last 72 hours. No lab exists for component: IPOC2    Imaging:  [x]   I have personally reviewed the patients radiographs and reports  XR Results (most recent):  CXR Results  (Last 48 hours)    None            CT Results (most recent):        IMPRESSION:   · Acute ischemic stroke- pt presented with left upper extremity weakness, s/p TPA (4/17)  · Hypertension- non-compliant with home regimen, systolic 397E to 263H. · Morbid obesity- BMI:46.3  · History of COVID-19 pneumonia, 02/24/2021     Patient Active Problem List   Diagnosis Code    Leiomyoma D21.9    Hypertensive crisis I16.9    CVA (cerebral vascular accident) ([facility] [address] I63.9        RECOMMENDATIONS:   Neuro:post TPA, repeat non-con head CT at 1830, serial neuro checks, neurology consult  Pulm: Aspiration precautions, HOB>30'. Maintain O2sats >90%  CVS:Monitor HD, MAP goal >65. GI: NPO. SLP consult for swallow eval  Renal: Trend Cr, UOP. Hem/Onc: Trend H/H, monitor for s/o active bleeding. Daily CBC. I/D:Trend WBCs and temperature curve. Metabolic: Daily BMP, mag, phos. Trend lytes and replace per protocol. Endocrine:Q6 glucoses. SSI. Avoid hypoglycemia. Musc/Skin:  PT/OT/SLP  Full Code  Discussed in interdisciplinary rounds     Best practice :    Glycemic control  IHI ICU bundles:    Sress ulcer prophylaxis. DVT prophylaxis:SCDs  Need for Lines, crooks assessed. High complexity decision making was performed during this consultation and evaluation. [x]       Pt is at high risk for further organ failure and dysfunction.      Critical care time spent: 30  minutes with patient exclusive of procedures.     Marisol Saha NP  04/18/21  Pulmonary, Critical Care Medicine  [facility]

## 2021-04-18 NOTE — ED NOTES
Patient talking to friend and watching TV, no complaints.   Purposeful rounding completed:    Side rails up x 1:  YES  Bed in low position and wheels locked: YES  Call bell within reach: YES  Comfort addressed: YES    Toileting needs addressed: YES  Plan of care reviewed/updated with patient and or family members: YES  IV site assessed: YES  Pain assessed and addressed: YES, 0

## 2021-04-18 NOTE — ED NOTES
Patient talking with family, patient with no complaints, POC discussed with pateint, will continue to monitor

## 2021-04-18 NOTE — ED NOTES
Patient resting at this time, states HA is gone, VSS.   Purposeful rounding completed:    Side rails up x 1:  YES  Bed in low position and wheels locked: YES  Call bell within reach: YES  Comfort addressed: YES    Toileting needs addressed: YES  Plan of care reviewed/updated with patient and or family members: YES  IV site assessed: YES  Pain assessed and addressed: YES, 0

## 2021-04-18 NOTE — PROGRESS NOTES
OT order received and chart reviewed. Patient currently has an active bedrest order. Please discontinue bedrest order for full participation in skilled OT evaluation/treatment.       Thank you for the referral,  TAYLOR Foster, OTR/L

## 2021-04-18 NOTE — CONSULTS
NEUROLOGY CONSULT NOTE    Patient ID:  Nohemi Peguero  [id number]  2 y.o.  1980    Date of Consultation:  2021    Referring Physician: Dr Maynor Clark    Reason for Consultation:  Left arm numbness and weakness        Subjective:       History of Present Illness: This is a 47 y.o. female with pmh significant for hypertension, not currently adherent to her home BP medications, who presented to the ER today with approx 1.5 hours of acute onset left arm weakness and numbness. She also reports slight blurring of her vision and her  reports she almost slumped over and he caught her. No other neurologic symptoms, no headache. She states that she was in her usual state of health up until 1.5h prior to arrival. She is not on any OCPs, not pregnant, and has no known history of afib or clots. She presently reports that she is doing a bit better. SHe received IV tPA. She reports a prior similar episode which was worked up as a possible stroke and was ultimately determined to be blood pressure related.          Patient Active Problem List    Diagnosis Date Noted    Leiomyoma 2017     Priority: 1 - One    CVA (cerebral vascular accident) ([facility] [address] 2021    Hypertensive crisis 2020     Past Medical History:   Diagnosis Date    Anemia, iron deficiency     Generalized headaches     Hypertension     NO CURRENT MEDS    Leiomyoma of uterus, unspecified       Past Surgical History:   Procedure Laterality Date    HX  SECTION      times 2    HX CHOLECYSTECTOMY      HX TUBAL LIGATION      IR OCCL TXCATH ORGAN W SI        Prior to Admission medications    Medication Sig Start Date End Date Taking? Authorizing Provider   amLODIPine (NORVASC) 10 mg tablet Take 1 Tab by mouth daily. 3/7/20   Crista Lr MD   metoprolol tartrate (LOPRESSOR) 50 mg tablet Take 1 Tab by mouth every twelve (12) hours.  3/6/20   Crista Lr MD     No Known Allergies   Social History     Tobacco Use  Smoking status: Never Smoker    Smokeless tobacco: Never Used   Substance Use Topics    Alcohol use: Yes      Family History   Problem Relation Age of Onset    Diabetes Father     Hypertension Father     Diabetes Maternal Grandmother               Review of Systems    Review of systems not obtained due to patient factors. Objective:     Patient Vitals for the past 8 hrs:   BP Temp Pulse Resp SpO2   04/18/21 1230  98.2 °F (36.8 °C)      04/18/21 1227   84  100 %   04/18/21 1226   84  100 %   04/18/21 1225   87  99 %   04/18/21 1224   88  100 %   04/18/21 1223   86  100 %   04/18/21 1222   87  99 %   04/18/21 1221   90  99 %   04/18/21 1130 (!) 146/90  80  99 %   04/18/21 1115   83  99 %   04/18/21 1100 (!) 163/93  91  100 %   04/18/21 1045   80  99 %   04/18/21 1030 (!) 158/104  81  100 %   04/18/21 1015     100 %   04/18/21 1000 (!) 151/99    100 %   04/18/21 0945   83  99 %   04/18/21 0930 (!) 143/100  89  100 %   04/18/21 0715   82 15 100 %   04/18/21 0700 (!) 150/92  82 19 100 %   04/18/21 0645   78 18 100 %   04/18/21 0630 (!) 154/99  77 23 100 %   04/18/21 0615   80 15 100 %   04/18/21 0600 (!) 148/94  81 17 100 %   04/18/21 0545   81 19 98 %   04/18/21 0530 (!) 143/106  83 16 98 %       General Exam  No acute distress, Obese body habitus    HEENT: Normocephalic, atraumatic, Sclera anicteric, normal conjunctiva  Mucous membranes: normal color and hydration   Lungs: clear. Skin: no lesions no rashes, normal color  CV:   Heart: regular to rate and rhythm.  No murmurs     Neurologic Exam:    Mental status:  Alert, oriented to person, place, time and circumstance  No visual spatial neglect or overt apraxia    Language: normal fluency and comprehension    Cranial nerves: PERRL, Extraocular movements intact and full, face symmetric to movement, Tongue midline with normal strength, palat symmetric    Motor: strength 5/5 throughout except left arm seems weak proximally at 3/5. No abnormal movements    Coordination: Normal finger-nose-finger, Normal rapid alternating movements    DTRs (R/L)  Biceps: (2/2)  Brachorad (2/2)  Triceps: (2/2)   Patellar (2/2)  Ankles (2/2)      Sensation: decreased by 50% to light touch in left hand. Gait: not tested        Data Review:    Recent Results (from the past 24 hour(s))   CBC WITH AUTOMATED DIFF    Collection Time: 04/17/21  5:45 PM   Result Value Ref Range    WBC 9.0 4.6 - 13.2 K/uL    RBC 5.42 (H) 4.20 - 5.30 M/uL    HGB 11.2 (L) 12.0 - 16.0 g/dL    HCT 37.5 35.0 - 45.0 %    MCV 69.2 (L) 74.0 - 97.0 FL    MCH 20.7 (L) 24.0 - 34.0 PG    MCHC 29.9 (L) 31.0 - 37.0 g/dL    RDW 18.6 (H) 11.6 - 14.5 %    PLATELET 815 725 - 646 K/uL    MPV 9.9 9.2 - 11.8 FL    NEUTROPHILS 50 40 - 73 %    LYMPHOCYTES 40 21 - 52 %    MONOCYTES 7 3 - 10 %    EOSINOPHILS 3 0 - 5 %    BASOPHILS 0 0 - 2 %    ABS. NEUTROPHILS 4.5 1.8 - 8.0 K/UL    ABS. LYMPHOCYTES 3.6 0.9 - 3.6 K/UL    ABS. MONOCYTES 0.6 0.05 - 1.2 K/UL    ABS. EOSINOPHILS 0.3 0.0 - 0.4 K/UL    ABS.  BASOPHILS 0.0 0.0 - 0.1 K/UL    DF SMEAR SCANNED      PLATELET COMMENTS ADEQUATE PLATELETS      RBC COMMENTS ANISOCYTOSIS  1+        RBC COMMENTS HYPOCHROMIA  1+        RBC COMMENTS TEARDROP CELLS  1+       METABOLIC PANEL, BASIC    Collection Time: 04/17/21  5:45 PM   Result Value Ref Range    Sodium 140 136 - 145 mmol/L    Potassium 3.9 3.5 - 5.5 mmol/L    Chloride 109 100 - 111 mmol/L    CO2 28 21 - 32 mmol/L    Anion gap 3 3.0 - 18 mmol/L    Glucose 90 74 - 99 mg/dL    BUN 8 7.0 - 18 MG/DL    Creatinine 0.53 (L) 0.6 - 1.3 MG/DL    BUN/Creatinine ratio 15 12 - 20      GFR est AA >60 >60 ml/min/1.73m2    GFR est non-AA >60 >60 ml/min/1.73m2    Calcium 9.4 8.5 - 10.1 MG/DL   PROTHROMBIN TIME + INR    Collection Time: 04/17/21  5:45 PM   Result Value Ref Range    Prothrombin time 13.0 11.5 - 15.2 sec    INR 1.0 0.8 - 1.2     TROPONIN I    Collection Time: 04/17/21  5:45 PM   Result Value Ref Range    Troponin-I, QT <0.02 0.0 - 0.045 NG/ML   HCG QL SERUM    Collection Time: 04/17/21  5:45 PM   Result Value Ref Range    HCG, Ql. Negative NEG     EKG, 12 LEAD, INITIAL    Collection Time: 04/17/21  6:18 PM   Result Value Ref Range    Ventricular Rate 95 BPM    Atrial Rate 95 BPM    P-R Interval 154 ms    QRS Duration 72 ms    Q-T Interval 338 ms    QTC Calculation (Bezet) 424 ms    Calculated P Axis 27 degrees    Calculated R Axis 61 degrees    Calculated T Axis 29 degrees    Diagnosis       Normal sinus rhythm  Normal ECG  No previous ECGs available  Confirmed by Aruna Yeh MD, --- ([id number]) on 4/18/2021 36:00:38 AM     METABOLIC PANEL, COMPREHENSIVE    Collection Time: 04/17/21  9:33 PM   Result Value Ref Range    Sodium 137 136 - 145 mmol/L    Potassium 3.7 3.5 - 5.5 mmol/L    Chloride 107 100 - 111 mmol/L    CO2 25 21 - 32 mmol/L    Anion gap 5 3.0 - 18 mmol/L    Glucose 101 (H) 74 - 99 mg/dL    BUN 12 7.0 - 18 MG/DL    Creatinine 0.48 (L) 0.6 - 1.3 MG/DL    BUN/Creatinine ratio 25 (H) 12 - 20      GFR est AA >60 >60 ml/min/1.73m2    GFR est non-AA >60 >60 ml/min/1.73m2    Calcium 9.2 8.5 - 10.1 MG/DL    Bilirubin, total 0.3 0.2 - 1.0 MG/DL    ALT (SGPT) 56 13 - 56 U/L    AST (SGOT) 32 10 - 38 U/L    Alk.  phosphatase 103 45 - 117 U/L    Protein, total 7.0 6.4 - 8.2 g/dL    Albumin 3.1 (L) 3.4 - 5.0 g/dL    Globulin 3.9 2.0 - 4.0 g/dL    A-G Ratio 0.8 0.8 - 1.7     CBC W/O DIFF    Collection Time: 04/17/21  9:33 PM   Result Value Ref Range    WBC 9.3 4.6 - 13.2 K/uL    RBC 4.97 4.20 - 5.30 M/uL    HGB 10.1 (L) 12.0 - 16.0 g/dL    HCT 34.9 (L) 35.0 - 45.0 %    MCV 70.2 (L) 74.0 - 97.0 FL    MCH 20.3 (L) 24.0 - 34.0 PG    MCHC 28.9 (L) 31.0 - 37.0 g/dL    RDW 18.3 (H) 11.6 - 14.5 %    PLATELET 701 045 - 911 K/uL    MPV 10.0 9.2 - 11.8 FL   PHOSPHORUS    Collection Time: 04/17/21  9:33 PM   Result Value Ref Range    Phosphorus 5.2 (H) 2.5 - 4.9 MG/DL   METABOLIC PANEL, COMPREHENSIVE    Collection Time: 04/18/21  4:20 AM   Result Value Ref Range    Sodium 138 136 - 145 mmol/L    Potassium 3.7 3.5 - 5.5 mmol/L    Chloride 110 100 - 111 mmol/L    CO2 25 21 - 32 mmol/L    Anion gap 3 3.0 - 18 mmol/L    Glucose 115 (H) 74 - 99 mg/dL    BUN 9 7.0 - 18 MG/DL    Creatinine 0.44 (L) 0.6 - 1.3 MG/DL    BUN/Creatinine ratio 20 12 - 20      GFR est AA >60 >60 ml/min/1.73m2    GFR est non-AA >60 >60 ml/min/1.73m2    Calcium 9.5 8.5 - 10.1 MG/DL    Bilirubin, total 0.3 0.2 - 1.0 MG/DL    ALT (SGPT) 54 13 - 56 U/L    AST (SGOT) 27 10 - 38 U/L    Alk. phosphatase 108 45 - 117 U/L    Protein, total 7.4 6.4 - 8.2 g/dL    Albumin 3.2 (L) 3.4 - 5.0 g/dL    Globulin 4.2 (H) 2.0 - 4.0 g/dL    A-G Ratio 0.8 0.8 - 1.7     CBC W/O DIFF    Collection Time: 04/18/21  4:20 AM   Result Value Ref Range    WBC 8.9 4.6 - 13.2 K/uL    RBC 5.05 4.20 - 5.30 M/uL    HGB 10.6 (L) 12.0 - 16.0 g/dL    HCT 34.7 (L) 35.0 - 45.0 %    MCV 68.7 (L) 74.0 - 97.0 FL    MCH 21.0 (L) 24.0 - 34.0 PG    MCHC 30.5 (L) 31.0 - 37.0 g/dL    RDW 18.1 (H) 11.6 - 14.5 %    PLATELET 932 898 - 099 K/uL    MPV 10.0 9.2 - 11.8 FL   MAGNESIUM    Collection Time: 04/18/21  4:20 AM   Result Value Ref Range    Magnesium 1.9 1.6 - 2.6 mg/dL   PHOSPHORUS    Collection Time: 04/18/21  4:20 AM   Result Value Ref Range    Phosphorus 3.5 2.5 - 4.9 MG/DL         Radiology studies: Head CT reviewed      Assessment: This is a 46 y/o AAF with severe HTN and recent non-compliance who presented with left arm paresthesias, blurry vision and near syncope. These symptoms are quite difficult to localize to one vascular territory. SHe is s/p IV tPA. Differential dx also includes hypertensive encephalopathy. Active Problems:    CVA (cerebral vascular accident) ([address] (4/17/2021)        Plan:     1. Continue post tPA orders. 2. MRI pending  3. If MRI is negative then treat as hypertensive encephalopathy        Jairo Arnold M.D.   Clinical Neurophysiology  Neuromuscular specialist

## 2021-04-19 ENCOUNTER — APPOINTMENT (OUTPATIENT)
Dept: GENERAL RADIOLOGY | Age: 41
DRG: 052 | End: 2021-04-19
Attending: INTERNAL MEDICINE
Payer: MEDICAID

## 2021-04-19 ENCOUNTER — APPOINTMENT (OUTPATIENT)
Dept: MRI IMAGING | Age: 41
DRG: 052 | End: 2021-04-19
Attending: PSYCHIATRY & NEUROLOGY
Payer: MEDICAID

## 2021-04-19 ENCOUNTER — APPOINTMENT (OUTPATIENT)
Dept: NON INVASIVE DIAGNOSTICS | Age: 41
DRG: 052 | End: 2021-04-19
Attending: STUDENT IN AN ORGANIZED HEALTH CARE EDUCATION/TRAINING PROGRAM
Payer: MEDICAID

## 2021-04-19 VITALS
BODY MASS INDEX: 45.99 KG/M2 | WEIGHT: 293 LBS | SYSTOLIC BLOOD PRESSURE: 169 MMHG | RESPIRATION RATE: 16 BRPM | TEMPERATURE: 98 F | HEIGHT: 67 IN | DIASTOLIC BLOOD PRESSURE: 108 MMHG | OXYGEN SATURATION: 100 % | HEART RATE: 82 BPM

## 2021-04-19 PROBLEM — R20.0 NUMBNESS AND TINGLING IN LEFT HAND: Status: ACTIVE | Noted: 2021-04-19

## 2021-04-19 PROBLEM — R20.2 NUMBNESS AND TINGLING IN LEFT HAND: Status: ACTIVE | Noted: 2021-04-19

## 2021-04-19 LAB
ALBUMIN SERPL-MCNC: 3.4 G/DL (ref 3.4–5)
ALBUMIN/GLOB SERPL: 0.8 {RATIO} (ref 0.8–1.7)
ALP SERPL-CCNC: 109 U/L (ref 45–117)
ALT SERPL-CCNC: 54 U/L (ref 13–56)
ANION GAP SERPL CALC-SCNC: 6 MMOL/L (ref 3–18)
AST SERPL-CCNC: 26 U/L (ref 10–38)
BILIRUB SERPL-MCNC: 0.4 MG/DL (ref 0.2–1)
BUN SERPL-MCNC: 5 MG/DL (ref 7–18)
BUN/CREAT SERPL: 11 (ref 12–20)
CALCIUM SERPL-MCNC: 9.6 MG/DL (ref 8.5–10.1)
CHLORIDE SERPL-SCNC: 107 MMOL/L (ref 100–111)
CO2 SERPL-SCNC: 25 MMOL/L (ref 21–32)
CREAT SERPL-MCNC: 0.47 MG/DL (ref 0.6–1.3)
ECHO AO ROOT DIAM: 3.2 CM
ECHO LA AREA 4C: 17.25 CM2
ECHO LA VOL 2C: 43.35 ML (ref 22–52)
ECHO LA VOL 4C: 43.91 ML (ref 22–52)
ECHO LA VOL BP: 51.32 ML (ref 22–52)
ECHO LA VOL/BSA BIPLANE: 21.48 ML/M2 (ref 16–28)
ECHO LA VOLUME INDEX A2C: 18.15 ML/M2 (ref 16–28)
ECHO LA VOLUME INDEX A4C: 18.38 ML/M2 (ref 16–28)
ECHO LV INTERNAL DIMENSION DIASTOLIC: 4.56 CM (ref 3.9–5.3)
ECHO LV INTERNAL DIMENSION SYSTOLIC: 2.51 CM
ECHO LV IVSD: 1.38 CM (ref 0.6–0.9)
ECHO LV MASS 2D: 250.7 G (ref 67–162)
ECHO LV MASS INDEX 2D: 105 G/M2 (ref 43–95)
ECHO LV POSTERIOR WALL DIASTOLIC: 1.4 CM (ref 0.6–0.9)
ECHO LVOT DIAM: 2.32 CM
ECHO LVOT PEAK GRADIENT: 2.75 MMHG
ECHO LVOT PEAK VELOCITY: 82.97 CM/S
ECHO LVOT SV: 65.6 ML
ECHO LVOT VTI: 15.54 CM
ECHO MV A VELOCITY: 80.85 CM/S
ECHO MV E DECELERATION TIME (DT): 157.4 MS
ECHO MV E VELOCITY: 94.44 CM/S
ECHO MV E/A RATIO: 1.17
ERYTHROCYTE [DISTWIDTH] IN BLOOD BY AUTOMATED COUNT: 18.3 % (ref 11.6–14.5)
FOLATE SERPL-MCNC: 11.4 NG/ML (ref 3.1–17.5)
GLOBULIN SER CALC-MCNC: 4.2 G/DL (ref 2–4)
GLUCOSE SERPL-MCNC: 110 MG/DL (ref 74–99)
HCT VFR BLD AUTO: 36 % (ref 35–45)
HGB BLD-MCNC: 10.8 G/DL (ref 12–16)
LVOT MG: 1.45 MMHG
MAGNESIUM SERPL-MCNC: 1.9 MG/DL (ref 1.6–2.6)
MCH RBC QN AUTO: 20.8 PG (ref 24–34)
MCHC RBC AUTO-ENTMCNC: 30 G/DL (ref 31–37)
MCV RBC AUTO: 69.2 FL (ref 74–97)
PHOSPHATE SERPL-MCNC: 3 MG/DL (ref 2.5–4.9)
PLATELET # BLD AUTO: 376 K/UL (ref 135–420)
PMV BLD AUTO: 9.8 FL (ref 9.2–11.8)
POTASSIUM SERPL-SCNC: 3.7 MMOL/L (ref 3.5–5.5)
PROT SERPL-MCNC: 7.6 G/DL (ref 6.4–8.2)
RBC # BLD AUTO: 5.2 M/UL (ref 4.2–5.3)
SODIUM SERPL-SCNC: 138 MMOL/L (ref 136–145)
VIT B12 SERPL-MCNC: 408 PG/ML (ref 211–911)
WBC # BLD AUTO: 8.7 K/UL (ref 4.6–13.2)

## 2021-04-19 PROCEDURE — 74011000250 HC RX REV CODE- 250: Performed by: INTERNAL MEDICINE

## 2021-04-19 PROCEDURE — 84100 ASSAY OF PHOSPHORUS: CPT

## 2021-04-19 PROCEDURE — 74011250637 HC RX REV CODE- 250/637: Performed by: STUDENT IN AN ORGANIZED HEALTH CARE EDUCATION/TRAINING PROGRAM

## 2021-04-19 PROCEDURE — 85027 COMPLETE CBC AUTOMATED: CPT

## 2021-04-19 PROCEDURE — 97161 PT EVAL LOW COMPLEX 20 MIN: CPT

## 2021-04-19 PROCEDURE — 70551 MRI BRAIN STEM W/O DYE: CPT

## 2021-04-19 PROCEDURE — 99239 HOSP IP/OBS DSCHRG MGMT >30: CPT | Performed by: INTERNAL MEDICINE

## 2021-04-19 PROCEDURE — 74011250636 HC RX REV CODE- 250/636: Performed by: NURSE PRACTITIONER

## 2021-04-19 PROCEDURE — 82746 ASSAY OF FOLIC ACID SERUM: CPT

## 2021-04-19 PROCEDURE — 99232 SBSQ HOSP IP/OBS MODERATE 35: CPT | Performed by: PSYCHIATRY & NEUROLOGY

## 2021-04-19 PROCEDURE — 97165 OT EVAL LOW COMPLEX 30 MIN: CPT

## 2021-04-19 PROCEDURE — 73030 X-RAY EXAM OF SHOULDER: CPT

## 2021-04-19 PROCEDURE — 83735 ASSAY OF MAGNESIUM: CPT

## 2021-04-19 PROCEDURE — 96374 THER/PROPH/DIAG INJ IV PUSH: CPT

## 2021-04-19 PROCEDURE — 74011250636 HC RX REV CODE- 250/636: Performed by: STUDENT IN AN ORGANIZED HEALTH CARE EDUCATION/TRAINING PROGRAM

## 2021-04-19 PROCEDURE — 80053 COMPREHEN METABOLIC PANEL: CPT

## 2021-04-19 RX ORDER — AMLODIPINE BESYLATE 5 MG/1
TABLET ORAL
COMMUNITY
End: 2021-04-19

## 2021-04-19 RX ORDER — SODIUM CHLORIDE 9 MG/ML
10 INJECTION INTRAMUSCULAR; INTRAVENOUS; SUBCUTANEOUS
Status: COMPLETED | OUTPATIENT
Start: 2021-04-19 | End: 2021-04-19

## 2021-04-19 RX ORDER — LOSARTAN POTASSIUM 50 MG/1
100 TABLET ORAL DAILY
Status: DISCONTINUED | OUTPATIENT
Start: 2021-04-19 | End: 2021-04-19 | Stop reason: HOSPADM

## 2021-04-19 RX ORDER — ATORVASTATIN CALCIUM 10 MG/1
10 TABLET, FILM COATED ORAL
Qty: 30 TAB | Refills: 3 | Status: SHIPPED | OUTPATIENT
Start: 2021-04-19

## 2021-04-19 RX ORDER — LANOLIN ALCOHOL/MO/W.PET/CERES
1 CREAM (GRAM) TOPICAL
Status: DISCONTINUED | OUTPATIENT
Start: 2021-04-20 | End: 2021-04-19 | Stop reason: HOSPADM

## 2021-04-19 RX ORDER — LANOLIN ALCOHOL/MO/W.PET/CERES
325 CREAM (GRAM) TOPICAL
Qty: 30 TAB | Refills: 0 | Status: SHIPPED | OUTPATIENT
Start: 2021-04-20

## 2021-04-19 RX ORDER — LOSARTAN POTASSIUM AND HYDROCHLOROTHIAZIDE 12.5; 1 MG/1; MG/1
TABLET ORAL
COMMUNITY
Start: 2021-02-10

## 2021-04-19 RX ORDER — HEPARIN SODIUM 5000 [USP'U]/ML
5000 INJECTION, SOLUTION INTRAVENOUS; SUBCUTANEOUS EVERY 8 HOURS
Status: DISCONTINUED | OUTPATIENT
Start: 2021-04-19 | End: 2021-04-19 | Stop reason: HOSPADM

## 2021-04-19 RX ORDER — ASPIRIN 325 MG
325 TABLET ORAL DAILY
Status: DISCONTINUED | OUTPATIENT
Start: 2021-04-19 | End: 2021-04-19 | Stop reason: HOSPADM

## 2021-04-19 RX ADMIN — ATORVASTATIN CALCIUM 80 MG: 40 TABLET, FILM COATED ORAL at 00:12

## 2021-04-19 RX ADMIN — Medication 10 ML: at 00:12

## 2021-04-19 RX ADMIN — SODIUM CHLORIDE 10 ML: 9 INJECTION INTRAMUSCULAR; INTRAVENOUS; SUBCUTANEOUS at 10:12

## 2021-04-19 RX ADMIN — LABETALOL 20 MG/4 ML (5 MG/ML) INTRAVENOUS SYRINGE 10 MG: at 00:12

## 2021-04-19 RX ADMIN — HEPARIN SODIUM 5000 UNITS: 5000 INJECTION INTRAVENOUS; SUBCUTANEOUS at 03:18

## 2021-04-19 NOTE — DISCHARGE SUMMARY
Discharge Summary    Patient: Manoj Leone               Sex: female          DOA: 4/17/2021         YOB: 1980      Age:  47 y.o.        LOS:  LOS: 2 days                Admit Date: 4/17/2021    Discharge Date: 4/19/2021    Primary care physician: Karla Tim DO    Discharge Diagnoses:    1. Left upper arm weakness and numbness on admission, s/p tPA 4/17/21      NO EVIDENCE OF STROKE ON MRI BRAIN   2. Hypertension with hypertensive urgency, hypertensive encephalopathy, resolved   3. Left shoulder pain, XRAY with Left shoulder osteoarthritis   4. Chronic iron deficiency anemia   5. Morbid obesity  6. Hyperlipidemia         Discharge Condition: Good  Disposition: home   Code Status: full code     Follow up for Primary Care Physician:*  1) she needs aggressive blood pressure control outpatient  2) she needs weight loss management outpatient   3) consider do sleep study outpatient   4) she needs pain management for her left shoulder arthritis     Hospital Course:   47 y.o with HTN, morbid obesity presented with acute onset left arm numbness and weakness and blurry vision, she was seen in the ER and treated for acute stroke with tPA. CT head was negative. 24 hour follow up CT head was negative. MRI brain was negative. Her left arm numbness resolved. Neurology consult and suspected that she has hypertensive encephalopathy instead      Last 24 Hours:  Feels better today, left upper arm weakness improved, she has left shoulder pain previously. Has headache but no aura or phonophobia   No swallowing complaint   XRAY of left shoulder :Degenerative changes of the [facility] joint and glenohumeral joint noted. Prominent  osteophyte extending inferiorly from the acromion noted.     ROS: No current fever/chills, no headache, no dizziness, no facial pain, no sinus congestion,   No swallowing pain, No chest pain, no palpitation, no shortness of breath, no abd pain,  No diarrhea, no urinary complaint, no leg pain or swelling    VS:   Visit Vitals  /87   Pulse 88   Temp 98.7 °F (37.1 °C)   Resp 16   Ht 5' 7\" (1.702 m)   Wt 134.3 kg (296 lb)   SpO2 99%   BMI 46.36 kg/m²      Tmax/24hrs: Temp (24hrs), Av.4 °F (36.9 °C), Min:98.2 °F (36.8 °C), Max:98.8 °F (37.1 °C)  No intake or output data in the 24 hours ending 21 1049    Tele:   General:  Cooperative, Not in acute distress, speaks in full sentence while in bed  HEENT: PERRL, EOMI, supple neck, no JVD, dry oral mucosa  Cardiovascular: S1S2 regular, no rub/gallop   Pulmonary: Clear air entry bilaterally, no wheezing, no crackle  GI:  Soft, non tender, non distended, +bs, no guarding   Extremities:  No pedal edema, +distal pulses appreciated   Neuro: AOx3, moving all extremities, no gross deficit. Consults:   Neurology: Dr Maksim Hutchins Studies:   CT Results (most recent):  Results from Hospital Encounter encounter on 21   CT HEAD WO CONT    Narrative CT head without IV contrast    HISTORY: Stroke. Follow-up post PA. Comparison 2021    All CT scans at this facility are performed using dose optimization technique as  appropriate to a performed exam, to include automated exposure control,  adjustment of the mA and/or kV according to patient size (including appropriate  matching for site specific examination) or use of iterative reconstruction  technique. No midline shift or extra-axial collection. Brain parenchyma within normal range  with preserved gray-white distinction. No intracranial hemorrhage. No mass  lesions. No focal sulcal effacement. Retention cyst or polyp in the right maxillary sinus. Impression No acute abnormalities. No interval change. No intracranial  hemorrhage.      MRI Results (most recent):  Results from [place] encounter on 21   MRI BRAIN WO CONT    Narrative Brain MR without contrast    HISTORY: Acute onset left arm weakness and numbness with slight blurring of  vision. Rule out stroke. Status post IV TPA. COMPARISON: CT and CTA 4/18/2021    TECHNIQUE: Brain scanned with sagittal and axial T1W scans, axial T2W , axial  FLAIR, axial diffusion weighted images and SWAN. FINDINGS:     Cerebral parenchyma: Single small right subinsular T2/FLAIR hyperintensity very  likely representing a perivascular space. No acute infarct. No mass effect or  obvious mass lesion. Brain volumes and ventricular system: Normal in size and morphology for the  patient's age. Midline structures: Normal.    Cerebellum: Normal.    Brainstem: Normal.    Vascular system: Expected arterial flow voids are present at the base of brain. Calvarium and skull base: Normal.    Paranasal sinuses and mastoid air cells: Mucous retention cysts in the maxillary  sinuses. Visualized orbits: Unremarkable for a nondedicated exam.    Visualized upper cervical spine: Unremarkable for a nondedicated exam.      Impression 1. No acute brain abnormalities. 2.  Small right subinsular hyperintensity very likely represents a normal  perivascular space. Brain is otherwise unremarkable. 3.  Mucous retention cysts in the maxillary sinuses. XR Results (most recent):  Results from Hospital Encounter encounter on 04/17/21   XR SHOULDER LT AP/LAT MIN 2 V    Narrative History: Shoulder pain. COMPARISON: None. TECHNIQUE: 2 views from a frontal projection of the left shoulder. Impression Findings/impression:    No acute fracture or dislocation is appreciated. Telemetry leads noted. Degenerative changes of the [facility] joint and glenohumeral joint noted. Prominent  osteophyte extending inferiorly from the acromion noted.            Lab/Data Review:  Labs: Results:       Chemistry Recent Labs     04/19/21  0450 04/18/21  0420 04/17/21  2133   * 115* 101*    138 137   K 3.7 3.7 3.7    110 107   CO2 25 25 25   BUN 5* 9 12   CREA 0.47* 0.44* 0.48*   CA 9.6 9.5 9.2   AGAP 6 3 5   BUCR 11* 20 25*    108 103   TP 7.6 7.4 7.0   ALB 3.4 3.2* 3.1*   GLOB 4.2* 4.2* 3.9   AGRAT 0.8 0.8 0.8      CBC w/Diff Recent Labs     04/19/21  0450 04/18/21  0420 04/17/21  2133 04/17/21  1745   WBC 8.7 8.9 9.3 9.0   RBC 5.20 5.05 4.97 5.42*   HGB 10.8* 10.6* 10.1* 11.2*   HCT 36.0 34.7* 34.9* 37.5    377 392 402   GRANS  --   --   --  50   LYMPH  --   --   --  40   EOS  --   --   --  3      Coagulation Recent Labs     04/17/21  1745   PTP 13.0   INR 1.0       Iron/Ferritin Recent Labs     04/18/21  0420   IRON 38*      BNP No results for input(s): BNPP in the last 72 hours. Cardiac Enzymes No results for input(s): CPK, CKND1, JEFF in the last 72 hours. No lab exists for component: CKRMB, TROIP   Liver Enzymes Recent Labs     04/19/21  0450   TP 7.6   ALB 3.4         Thyroid Studies No results for input(s): T4, T3U, TSH, TSHEXT in the last 72 hours. No lab exists for component: T3RU       All Micro Results     None                Medications at discharge  including reasons for change and indications for new ones:   Current Discharge Medication List      START taking these medications    Details   ferrous sulfate 325 mg (65 mg iron) tablet Take 1 Tab by mouth daily (with breakfast). Qty: 30 Tab, Refills: 0      atorvastatin (LIPITOR) 10 mg tablet Take 1 Tab by mouth nightly.  Indications: excessive fat in the blood  Qty: 30 Tab, Refills: 3         CONTINUE these medications which have NOT CHANGED    Details   losartan-hydroCHLOROthiazide (HYZAAR) 100-12.5 mg per tablet take 1 tablet by mouth once daily for blood pressure         STOP taking these medications       amLODIPine (NORVASC) 5 mg tablet Comments:   Reason for Stopping:                       Pending laboratory work and tests: none    Activity: Activity as tolerated    Diet: Cardiac Diet and Low fat, Low cholesterol    Wound Care: None needed        Laila Bennett MD  4/19/2021  10:49 AM

## 2021-04-19 NOTE — PROGRESS NOTES
[facility] Hospitalist Group  Progress Note    Patient: Rodolfo Delgadillo Age: 47 y.o. : 1980 MR#: [id number] SSN: [id number]  Date: 2021     Subjective:   Chief Complaint on Admission: LUE numbness and weakness    Patient reports improvement in left sided numbness, resolution of frontal HA with tylenol earlier today, improved vision     Patient reports not taking her hctz-losartan x 1 week before admission     Assessment/Plan:   1. LUE numbness / weakness r/o CVA s/p TPA on  at 1830 - CT head review / patient stable for transfer from ICU level of care, neuro following. Awaiting MRI (was ordered earlier today), cont PRN labetolol, statin, start asa. Consider Hypertension related if MRI negative  2. Hypertension - Allow permissive HTN, awaiting MRI; non-compliant with oral antihypertensives PTA  3. Chronic microcytic anemia, iron deficient - no evidence of acute bleeding, reports being intolerant to oral supplements. States followed by [facility] as OP for her anemia. Last menses last week  4. Obesity - Body mass index is 46.36 kg/m². Additional Notes:      Case discussed with:  [x]Patient  []Family  []Nursing  []Case Management  DVT Prophylaxis:  []Lovenox  [x]Hep SQ  []SCDs  []Coumadin   []On Heparin gtt    Objective:     VS:   Visit Vitals  BP (!) 150/94   Pulse 83   Temp 98.2 °F (36.8 °C)   Resp 19   Ht 5' 7\" (1.702 m)   Wt 134.3 kg (296 lb)   SpO2 100%   BMI 46.36 kg/m²      Tmax/24hrs: Temp (24hrs), Av.2 °F (36.8 °C), Min:98.2 °F (36.8 °C), Max:98.4 °F (36.9 °C)  No intake or output data in the 24 hours ending 21 2231    General:  Alert, NAD  HEENT: PERRLA  Cardiovascular:  RRR, Nl S1/S2  Pulmonary:  LSC throughout.  Normal resp effort  GI:  +BS in all four quadrants, soft, non-tender  Extremities:  No edema; 2+ dorsalis pedis pulses bilaterally  Neuro: alert and oriented x 4; left hand 3-4/5, LLE and RUE / RLE  strength    Labs / micro / imaging : Recent Results (from the past 24 hour(s))   METABOLIC PANEL, COMPREHENSIVE    Collection Time: 04/18/21  4:20 AM   Result Value Ref Range    Sodium 138 136 - 145 mmol/L    Potassium 3.7 3.5 - 5.5 mmol/L    Chloride 110 100 - 111 mmol/L    CO2 25 21 - 32 mmol/L    Anion gap 3 3.0 - 18 mmol/L    Glucose 115 (H) 74 - 99 mg/dL    BUN 9 7.0 - 18 MG/DL    Creatinine 0.44 (L) 0.6 - 1.3 MG/DL    BUN/Creatinine ratio 20 12 - 20      GFR est AA >60 >60 ml/min/1.73m2    GFR est non-AA >60 >60 ml/min/1.73m2    Calcium 9.5 8.5 - 10.1 MG/DL    Bilirubin, total 0.3 0.2 - 1.0 MG/DL    ALT (SGPT) 54 13 - 56 U/L    AST (SGOT) 27 10 - 38 U/L    Alk. phosphatase 108 45 - 117 U/L    Protein, total 7.4 6.4 - 8.2 g/dL    Albumin 3.2 (L) 3.4 - 5.0 g/dL    Globulin 4.2 (H) 2.0 - 4.0 g/dL    A-G Ratio 0.8 0.8 - 1.7     CBC W/O DIFF    Collection Time: 04/18/21  4:20 AM   Result Value Ref Range    WBC 8.9 4.6 - 13.2 K/uL    RBC 5.05 4.20 - 5.30 M/uL    HGB 10.6 (L) 12.0 - 16.0 g/dL    HCT 34.7 (L) 35.0 - 45.0 %    MCV 68.7 (L) 74.0 - 97.0 FL    MCH 21.0 (L) 24.0 - 34.0 PG    MCHC 30.5 (L) 31.0 - 37.0 g/dL    RDW 18.1 (H) 11.6 - 14.5 %    PLATELET 398 672 - 259 K/uL    MPV 10.0 9.2 - 11.8 FL   MAGNESIUM    Collection Time: 04/18/21  4:20 AM   Result Value Ref Range    Magnesium 1.9 1.6 - 2.6 mg/dL   PHOSPHORUS    Collection Time: 04/18/21  4:20 AM   Result Value Ref Range    Phosphorus 3.5 2.5 - 4.9 MG/DL   IRON PROFILE    Collection Time: 04/18/21  4:20 AM   Result Value Ref Range    Iron 38 (L) 50 - 175 ug/dL    TIBC 374 250 - 450 ug/dL    Iron % saturation 10 (L) 20 - 50 %   FERRITIN    Collection Time: 04/18/21  4:20 AM   Result Value Ref Range    Ferritin 34 8 - 388 NG/ML     Results     ** No results found for the last 336 hours. **        Ct Head Wo Cont    Result Date: 4/18/2021  No acute abnormalities. No interval change. No intracranial hemorrhage.     Ct Head Wo Cont    Result Date: 4/17/2021  No intracranial hemorrhage or mass effect. Findings discussed with Dr. Amanda James at 6:05 PM April 17, 2021 by telephone. Cta Head Neck W Cont    Result Date: 4/18/2021  Patent intra- and extracranial cerebral arteries without evidence of significant stenosis.       Signed By: Kavya Hugo NP     April 18, 2021

## 2021-04-19 NOTE — PROGRESS NOTES
OT order received and chart reviewed. Patient currently has an active bedrest complete order. Please discontinue bedrest order for full participation in skilled OT evaluation/treatment.           Thank you for this referral,    Cha Coombs MS, OTR/L

## 2021-04-19 NOTE — PROGRESS NOTES
Problem: Self Care Deficits Care Plan (Adult)  Goal: *Acute Goals and Plan of Care (Insert Text)  Outcome: Resolved/Met       OCCUPATIONAL THERAPY EVALUATION/DISCHARGE    Patient: Angelica Lomeli (24 y.o. female)  Date: 2021  Primary Diagnosis: CVA (cerebral vascular accident) [facility]) [I63.9]  Precautions:  Fall  PLOF: Patient was independent with self-care and functional mobility PTA. ASSESSMENT AND RECOMMENDATIONS:  Patient cleared to participate in OT evaluation by RN. Upon entering the room, the patient was supine in bed, alert, and agreeable to participate in OT evaluation. Based on the objective data described below, the patient presents with no deficits that impede pt function with ADLs, functional transfers, and functional mobility. Patient reports she is at her functional baseline for self-care tasks. OT to d/c from caseload at this time. Vitals this session      Vitals   BP   O2   HR   Pre session 158/92 97%  91 bpm   Post session 171/98  98%  88 bpm         Skilled occupational therapy is not indicated at this time.   Discharge Recommendations: None  Further Equipment Recommendations for Discharge: N/A      SUBJECTIVE:   Patient stated I just have a headache    OBJECTIVE DATA SUMMARY:     Past Medical History:   Diagnosis Date    Anemia, iron deficiency     Generalized headaches     Hypertension     NO CURRENT MEDS    Leiomyoma of uterus, unspecified      Past Surgical History:   Procedure Laterality Date    HX  SECTION      times 2    HX CHOLECYSTECTOMY      HX TUBAL LIGATION      IR OCCL TXCATH ORGAN W SI       Barriers to Learning/Limitations: None  Compensate with: visual, verbal, tactile, kinesthetic cues/model    Home Situation:   Home Situation  Home Environment: Private residence  # Steps to Enter: 4  Rails to Enter: Yes  One/Two Story Residence: One story  Living Alone: No  Support Systems: Spouse/Significant Other/Partner  Current DME Used/Available at Home: None  Tub or Shower Type: Tub/Shower combination  [x]     Right hand dominant   []     Left hand dominant    Cognitive/Behavioral Status:  Neurologic State: Alert  Orientation Level: Oriented X4  Cognition: Follows commands  Safety/Judgement: Fall prevention; Awareness of environment    Skin: Intact  Edema: None noted    Vision/Perceptual:    Acuity: Within Defined Limits      Coordination: BUE  Fine Motor Skills-Upper: Left Intact; Right Intact    Gross Motor Skills-Upper: Left Intact; Right Intact    Balance:  Sitting: Intact  Standing: Intact    Strength: BUE  Strength: Generally decreased, functional(RUE 4+/5, LUE 3+/5)     Tone & Sensation: BUE  Tone: Normal  Sensation: Intact(reports numbness resolved)    Range of Motion: BUE  AROM: Within functional limits    Functional Mobility and Transfers for ADLs:  Bed Mobility:  Supine to Sit: Modified independent  Sit to Supine: Modified independent  Scooting: Modified independent    Transfers:  Sit to Stand: Independent  Stand to Sit: Independent    ADL Assessment:  Feeding: Independent    Oral Facial Hygiene/Grooming: Independent    Bathing: Independent    Upper Body Dressing: Independent    Lower Body Dressing: Independent    Toileting: Independent    ADL Intervention:  Upper Body Dressing Assistance  Dressing Assistance: [id number] [facility] [id number]: Independent    Lower Body Dressing Assistance  Dressing Assistance: Independent  Socks: Independent  Leg Crossed Method Used: Yes  Position Performed: Seated edge of bed    Cognitive Retraining  Safety/Judgement: Fall prevention; Awareness of environment    Pain:  Pain level pre-treatment: 5/10 , head  Pain level post-treatment: 5/10   Pain Intervention(s): Medication (see MAR); Response to intervention: Nurse notified, See doc flow    Activity Tolerance:   Patient demonstrated good activity tolerance during OT evaluation. Please refer to the flowsheet for vital signs taken during this treatment.   After treatment:   []  Patient left in no apparent distress sitting up in chair  [x]  Patient left in no apparent distress in bed  [x]  Call bell left within reach  [x]  Nursing notified  [x]  Transport present  []  Bed alarm activated    COMMUNICATION/EDUCATION:   [x]      Role of Occupational Therapy in the acute care setting  [x]      Home safety education was provided and the patient/caregiver indicated understanding. [x]      Patient/family have participated as able and agree with findings and recommendations. []      Patient is unable to participate in plan of care at this time. Thank you for this referral.  Morteza Mcmullen OTR/L  Time Calculation: 11 mins      Eval Complexity: History: MEDIUM Complexity : Expanded review of history including physical, cognitive and psychosocial  history ; Examination: LOW Complexity : 1-3 performance deficits relating to physical, cognitive , or psychosocial skils that result in activity limitations and / or participation restrictions ;    Decision Making:LOW Complexity : No comorbidities that affect functional and no verbal or physical assistance needed to complete eval tasks

## 2021-04-19 NOTE — PROGRESS NOTES
Problem: Mobility Impaired (Adult and Pediatric)  Goal: *Acute Goals and Plan of Care (Insert Text)  Description: Physical Therapy Goals  Initiated 4/19/2021 and to be accomplished within 7 day(s)  1. Patient will move from supine to sit and sit to supine  in bed with modified independence. 2.  Patient will transfer from bed to chair and chair to bed with modified independence using the least restrictive device. 3.  Patient will perform sit to stand with modified independence. 4.  Patient will ambulate with modified independence for 300 feet with the least restrictive device. 5.  Patient will ascend/descend B stairs with 4 handrail(s) with modified independence. PLOF: Pt lives with her family in a [facility] with 4 JEMMA. She was indep with all mobility PTA without use of an AD. Outcome: Progressing Towards Goal     PHYSICAL THERAPY EVALUATION    Patient: Rodolfo Delgadillo (32 y.o. female)  Date: 4/19/2021  Primary Diagnosis: CVA (cerebral vascular accident) [facility]) [I63.9]        Precautions:  Fall    ASSESSMENT :  Based on the objective data described below, the patient presents with decreased L UE strength and endurance. Pt seen with both PT and OT to maximize patients safety, mobility, and participation. Pt was found supine in stretcher, in NAD, willing to work with PT. She states she has a mild frontal headache but other than that, no numbness or tingling/weakness. She reports walking to the bathroom and back without issues. She performed supine-sit with SBA. Transport outside room to take patient for procedure. She stood with SBA and no AD to amb 2 steps to get higher in the stretcher, She returned back supine on stretcher with HOB elevated, all needs met, left with transport staff. She would benefit from 1-2 more PT sessions to ensure safety with all mobility. Recommend d/c home with [facility]. Patient will benefit from skilled intervention to address the above impairments.   Patient's rehabilitation potential is considered to be Good  Factors which may influence rehabilitation potential include:   []         None noted  []         Mental ability/status  [x]         Medical condition  []         Home/family situation and support systems  []         Safety awareness  []         Pain tolerance/management  []         Other:      PLAN :  Recommendations and Planned Interventions:   [x]           Bed Mobility Training             [x]    Neuromuscular Re-Education  [x]           Transfer Training                   []    Orthotic/Prosthetic Training  [x]           Gait Training                          []    Modalities  [x]           Therapeutic Exercises           []    Edema Management/Control  [x]           Therapeutic Activities            [x]    Family Training/Education  [x]           Patient Education  []           Other (comment):    Frequency/Duration: Patient will be followed by physical therapy 1-2 times per day/4-7 days per week to address goals. Discharge Recommendations: Home Health  Further Equipment Recommendations for Discharge: N/A     SUBJECTIVE:   Patient stated i'm doing fine.     OBJECTIVE DATA SUMMARY:     Past Medical History:   Diagnosis Date    Anemia, iron deficiency     Generalized headaches     Hypertension     NO CURRENT MEDS    Leiomyoma of uterus, unspecified      Past Surgical History:   Procedure Laterality Date    HX  SECTION      times 2    HX CHOLECYSTECTOMY      HX TUBAL LIGATION  2009    IR OCCL TXCATH ORGAN W SI       Barriers to Learning/Limitations: None  Compensate with: N/A  Home Situation:  Home Situation  Home Environment: Private residence  # Steps to Enter: 4  Rails to Enter: Yes  One/Two Story Residence: One story  Living Alone: No  Support Systems: Spouse/Significant Other/Partner  Current DME Used/Available at Home: None  Tub or Shower Type: Tub/Shower combination  Critical Behavior:  Neurologic State: Alert  Orientation Level: Oriented X4  Cognition: Follows commands  Safety/Judgement: Fall prevention; Awareness of environment  Psychosocial  Patient Behaviors: Calm; Cooperative    Strength:    Strength: Generally decreased, functional(RUE 4+/5, LUE 3+/5)       Tone & Sensation:   Tone: Normal    Sensation: Intact(reports numbness resolved)    Range Of Motion:  AROM: Within functional limits    Functional Mobility:  Bed Mobility:     Supine to Sit: Modified independent  Sit to Supine: Modified independent  Scooting: Modified independent  Transfers:  Sit to Stand: Independent  Stand to Sit: Independent          Balance:   Sitting: Intact  Standing: Intact       Ambulation/Gait Training:  Distance (ft): 2 Feet (ft)  Assistive Device: Other (comment)(none)           Gait Abnormalities: Decreased step clearance        Base of Support: Narrowed        Step Length: Left shortened;Right shortened    Pain:  Pain level pre-treatment: 0/10   Pain level post-treatment: 0/10   Pain Intervention(s) : Medication (see MAR); Rest, Repositioning  Response to intervention: Nurse notified, See doc flow    Activity Tolerance:   Pt tolerated   Please refer to the flowsheet for vital signs taken during this treatment. After treatment:   []         Patient left in no apparent distress sitting up in chair  [x]         Patient left in no apparent distress in bed  [x]         Call bell left within reach  [x]         Nursing notified  []         Caregiver present  []         Bed alarm activated  []         SCDs applied    COMMUNICATION/EDUCATION:   [x]         Role of Physical Therapy in the acute care setting. [x]         Fall prevention education was provided and the patient/caregiver indicated understanding. [x]         Patient/family have participated as able in goal setting and plan of care. []         Patient/family agree to work toward stated goals and plan of care. []         Patient understands intent and goals of therapy, but is neutral about his/her participation.   []         Patient is unable to participate in goal setting/plan of care: ongoing with therapy staff.  []         Other:     Thank you for this referral.  Dewitte Safe   Time Calculation: 12 mins      Eval Complexity: History: LOW Complexity : Zero comorbidities / personal factors that will impact the outcome / POCExam:LOW Complexity : 1-2 Standardized tests and measures addressing body structure, function, activity limitation and / or participation in recreation  Presentation: LOW Complexity : Stable, uncomplicated  Clinical Decision Making:Low Complexity    Overall Complexity:LOW

## 2021-04-19 NOTE — PROGRESS NOTES
Hospitalist Progress Note    Patient: Williams Olvera Age: 47 y.o. : 1980 MR#: [id number] SSN: [id number]  Date/Time: 2021 9:09 AM    DOA: 2021  PCP: Fabrizio Campos DO    Subjective:     Feels better today, left upper arm weakness improved, she has left shoulder pain previously. Has headache but no aura or phonophobia   No swallowing complaint     MRI brain pending   CT head on repeat after tPA was negative (yesterday)      Interval Hospital Course:        ROS:  No current fever/chills, no headache, no dizziness, no facial pain, no sinus congestion,   No swallowing pain, No chest pain, no palpitation, no shortness of breath, no abd pain,  No diarrhea, no urinary complaint, no leg pain or swelling      Assessment/Plan:   1. Left upper arm weakness and numbness on admission, s/p tPA 21  2. Hypertension with hypertensive urgency   3. Left shoulder pain   4. Chronic iron deficiency anemia   5. Morbid obesity     MRI brain follow up, she can resume her antihypertensive medications today  Ok with ASA and statin, pending further imaging finding   PT/OT/SPT.    Echo  XRAY of left shoulder   pepcid  She can go home today if finding are negative       Full code    Additional Notes:    Time spent >35 minutes    Case discussed with:  [x]Patient  []Family  [x]Nursing  [x]Case Management  DVT Prophylaxis:  []Lovenox  [x]Hep SQ  []SCDs  []Coumadin   []On Heparin gtt    Signed By: Brett Cannon MD     2021 9:09 AM              Objective:   VS:   Visit Vitals  /87   Pulse 79   Temp 98.7 °F (37.1 °C)   Resp 19   Ht 5' 7\" (1.702 m)   Wt 134.3 kg (296 lb)   SpO2 100%   BMI 46.36 kg/m²      Tmax/24hrs: Temp (24hrs), Av.4 °F (36.9 °C), Min:98.2 °F (36.8 °C), Max:98.8 °F (37.1 °C)  No intake or output data in the 24 hours ending 21 0909    Tele: sinus   General:  Cooperative, Not in acute distress, speaks in full sentence while in bed  HEENT: PERRL, EOMI, supple neck, no JVD, dry oral mucosa  Cardiovascular: S1S2 regular, no rub/gallop   Pulmonary: Clear air entry bilaterally, no wheezing, no crackle  GI:  Soft, non tender, non distended, +bs, no guarding   Extremities:  No pedal edema, +distal pulses appreciated   Neuro: AOx3, moving all extremities, no gross deficit.      Additional:       Current Facility-Administered Medications   Medication Dose Route Frequency    heparin (porcine) injection 5,000 Units  5,000 Units SubCUTAneous Q8H    aspirin tablet 325 mg  325 mg Oral DAILY    labetaloL (NORMODYNE;TRANDATE) 20 mg/4 mL (5 mg/mL) injection 10 mg  10 mg IntraVENous Q4H PRN    sodium chloride (NS) flush 5-40 mL  5-40 mL IntraVENous Q8H    sodium chloride (NS) flush 5-40 mL  5-40 mL IntraVENous PRN    acetaminophen (TYLENOL) tablet 650 mg  650 mg Oral Q6H PRN    Or    acetaminophen (TYLENOL) suppository 650 mg  650 mg Rectal Q6H PRN    polyethylene glycol (MIRALAX) packet 17 g  17 g Oral DAILY PRN    atorvastatin (LIPITOR) tablet 80 mg  80 mg Oral QHS     Current Outpatient Medications   Medication Sig    losartan-hydroCHLOROthiazide (HYZAAR) 100-12.5 mg per tablet take 1 tablet by mouth once daily for blood pressure            Lab/Data Review:  Labs: Results:       Chemistry Recent Labs     04/19/21 0450 04/18/21 0420 04/17/21 2133   * 115* 101*    138 137   K 3.7 3.7 3.7    110 107   CO2 25 25 25   BUN 5* 9 12   CREA 0.47* 0.44* 0.48*   BUCR 11* 20 25*   AGAP 6 3 5   CA 9.6 9.5 9.2   PHOS 3.0 3.5 5.2*     Recent Labs     04/19/21 0450 04/18/21 0420 04/17/21 2133   ALT 54 54 56   TP 7.6 7.4 7.0   ALB 3.4 3.2* 3.1*   GLOB 4.2* 4.2* 3.9   AGRAT 0.8 0.8 0.8      CBC w/Diff Recent Labs     04/19/21 0450 04/18/21 0420 04/17/21 2133 04/17/21  1745   WBC 8.7 8.9 9.3 9.0   RBC 5.20 5.05 4.97 5.42*   HGB 10.8* 10.6* 10.1* 11.2*   HCT 36.0 34.7* 34.9* 37.5   MCV 69.2* 68.7* 70.2* 69.2*   MCH 20.8* 21.0* 20.3* 20.7*   MCHC 30.0* 30.5* 28.9* 29.9*   RDW 18.3* 18.1* 18.3* 18.6*    377 392 402   GRANS  --   --   --  50   LYMPH  --   --   --  40   EOS  --   --   --  3      Coagulation Recent Labs     04/17/21  1745   PTP 13.0   INR 1.0       Iron/Ferritin Lab Results   Component Value Date/Time    Iron 38 (L) 04/18/2021 04:20 AM    TIBC 374 04/18/2021 04:20 AM    Iron % saturation 10 (L) 04/18/2021 04:20 AM    Ferritin 34 04/18/2021 04:20 AM       BNP    Cardiac Enzymes Lab Results   Component Value Date/Time    Troponin-I <0.015 02/24/2021 06:45 AM    Troponin-I, QT <0.02 04/17/2021 05:45 PM        Lactic Acid    Thyroid Studies          All Micro Results     None            Images:    CT (Most Recent). CT Results (most recent):  Results from Hospital Encounter encounter on 04/17/21   CT HEAD WO CONT    Narrative CT head without IV contrast    HISTORY: Stroke. Follow-up post PA. Comparison April 17, 2021    All CT scans at this facility are performed using dose optimization technique as  appropriate to a performed exam, to include automated exposure control,  adjustment of the mA and/or kV according to patient size (including appropriate  matching for site specific examination) or use of iterative reconstruction  technique. No midline shift or extra-axial collection. Brain parenchyma within normal range  with preserved gray-white distinction. No intracranial hemorrhage. No mass  lesions. No focal sulcal effacement. Retention cyst or polyp in the right maxillary sinus. Impression No acute abnormalities. No interval change. No intracranial  hemorrhage. XRAY (Most Recent)      EKG No results found for this or any previous visit.      2D ECHO

## 2021-04-19 NOTE — DISCHARGE INSTRUCTIONS
Discharge Instructions    Patient: Andre Ramirez MRN: [id number]  CSN: [id number]    YOB: 1980  Age: 47 y.o. Sex: female    DOA: 4/17/2021 LOS:  LOS: 2 days   Discharge Date:      ACUTE DIAGNOSES:  1. Left upper arm weakness and numbness on admission, s/p tPA 4/17/21      NO EVIDENCE OF STROKE ON MRI BRAIN   2. Hypertension with hypertensive urgency   3. Left shoulder pain   4. Chronic iron deficiency anemia   5. Morbid obesity  6. Hyperlipidemia         DISCHARGE MEDICATIONS:         · It is important that you take the medication exactly as they are prescribed. · Keep your medication in the bottles provided by the pharmacist and keep a list of the medication names, dosages, and times to be taken in your wallet. · Do not take other medications without consulting your doctor. DIET:  Cardiac Diet and Low fat, Low cholesterol    ACTIVITY: Activity as tolerated    ADDITIONAL INFORMATION: If you experience any of the following symptoms then please call your primary care physician or return to the emergency room if you cannot get hold of your doctor: Fever, chills, nausea, vomiting, diarrhea, change in mentation, falling, bleeding, shortness of breath. FOLLOW UP CARE:  Dr. Shannan Singletary, [address], DO  you are to call and set up an appointment to see them in 2 weeks. Follow-up with [facility] neurology as needed       Information obtained by :  I understand that if any problems occur once I am at home I am to contact my physician. I understand and acknowledge receipt of the instructions indicated above.                                                                                                                                            Physician's or R.N.'s Signature                                                                  Date/Time Patient or Representative Signature                                                          Date/Time    Caryn Harmon MD  4/19/2021  10:46 AM

## 2021-04-19 NOTE — PROGRESS NOTES
Neurology Progress Note    Patient ID:  Blanche Mccallum  [id number]  37 y.o.  1980    Subjective:      Patient is a 46 yo woman with hx of HTN, morbid obesity admitted on 17 April with acute onset left arm numbness and weakness, blurry vision, now s/p tpa on 17 April. She is seen as follow up. She feels improved. Left arm weakness resolved. She feels safe with discharge home    Brain MRI negative for stroke. Suspect she had a HTN  Encephalopathy. Current Facility-Administered Medications   Medication Dose Route Frequency    heparin (porcine) injection 5,000 Units  5,000 Units SubCUTAneous Q8H    aspirin tablet 325 mg  325 mg Oral DAILY    labetaloL (NORMODYNE;TRANDATE) 20 mg/4 mL (5 mg/mL) injection 10 mg  10 mg IntraVENous Q4H PRN    sodium chloride (NS) flush 5-40 mL  5-40 mL IntraVENous Q8H    sodium chloride (NS) flush 5-40 mL  5-40 mL IntraVENous PRN    acetaminophen (TYLENOL) tablet 650 mg  650 mg Oral Q6H PRN    Or    acetaminophen (TYLENOL) suppository 650 mg  650 mg Rectal Q6H PRN    polyethylene glycol (MIRALAX) packet 17 g  17 g Oral DAILY PRN    atorvastatin (LIPITOR) tablet 80 mg  80 mg Oral QHS     Current Outpatient Medications   Medication Sig    losartan-hydroCHLOROthiazide (HYZAAR) 100-12.5 mg per tablet take 1 tablet by mouth once daily for blood pressure    amLODIPine (NORVASC) 5 mg tablet amlodipine 5 mg tablet          Objective: Active hospital medications were reviewed    Lab results and neuroradiology studies from the last 24 hours were reviewed. Prior to Admission medications    Medication Sig Start Date End Date Taking?  Authorizing Provider   losartan-hydroCHLOROthiazide [facility]) 100-12.5 mg per tablet take 1 tablet by mouth once daily for blood pressure 2/10/21  Yes Provider, Historical   amLODIPine (NORVASC) 5 mg tablet amlodipine 5 mg tablet    Provider, Historical     Patient Vitals for the past 8 hrs:   BP Temp Pulse Resp SpO2   04/19/21 0901 (!) 170/98  88 16 99 %   04/19/21 0600 128/87 98.7 °F (37.1 °C) 79 19 100 %   04/19/21 0512     99 %   04/19/21 0500 (!) 143/93  81 22 99 %   04/19/21 0430   88 18 99 %   04/19/21 0415   88 18 100 %   04/19/21 0400 (!) 145/87 98.8 °F (37.1 °C) 86 22 100 %   04/19/21 0345   89 22 99 %   04/19/21 0330 (!) 143/94  83 21 98 %   04/19/21 0315   88 22 100 %   04/19/21 0300 (!) 142/83  89 17 100 %   04/19/21 0245   88 20 93 %   04/19/21 0230 (!) 146/92  87 24 100 %   04/19/21 0215   85 11 100 %   04/19/21 0200 (!) 142/97 98.7 °F (37.1 °C) 85 24 97 %   RRIOCURSHIFTNo intake/output data recorded. No intake/output data recorded. RESULTRCNT(24h)Active Problems:    CVA (cerebral vascular accident) ([facility] [address] (4/17/2021)      Numbness and tingling in left hand (4/19/2021)        Additional comments:I reviewed the patient's new clinical lab test results. and I reviewed the patients new imaging test results. General Exam  No acute distress, mucous membranes normal color and hydration status    C-V regular rate and rhythm:   Resp: CTA B    Neurologic Exam    Mental status:  Alert, oriented to person, place, time and circumstance  Language: normal fluency and comprehension  No visual spatial neglect or overt apraxia    Cranial nerves: PERRL, Extraocular movements intact and full, face symmetric to movement, Tongue midline with normal strength, palat symmetric    Motor: strength 5/5 throughout No pronator drift    Reflexes: 2+ and symmetric bilateral upper and lower extremities    Gait: Normal native gait. Romberg: normal.      Assessment:     Koki Hall is a 47 y.o.  woman with hx of HTN, morbid obesity admitted on 17 April with acute onset left arm numbness and weakness, blurry vision, s/p tPA , MRI brain normal, suspect cause of her symptoms to be HTN encephalopathy.    Patient also has anemia, I recommend iron replacement supplement    Plan:     - ok to discharge home today   - BP control  -Okay to continue aspirin 81 mg for primary stroke prevention as she has history of hypertension  -Iron supplement for anemia  - nutritionist srinivas to discuss weight loss regimen.         Signed:  Sam Santacruz MD  Adult Neurologist  4/19/2021  9:32 AM

## 2021-04-19 NOTE — PROGRESS NOTES
PT order received and chart reviewed. Patient currently has an active bedrest order. Please discontinue bedrest order for full participation in skilled PT evaluation/treatment. Will follow up as patient schedule allows.      Thank you for the referral.    Petr Merlos, PT, DPT

## 2021-04-19 NOTE — PROGRESS NOTES
SLP Note:    New order received; however, pt evaluated and discharged as no skilled SLP needs identified. Please see note dated 4/18/21.      Thank you for this referral,   Addison Sanchez M.S., [id number] [facility]  Speech-Language Pathologist

## 2021-06-08 ENCOUNTER — HOSPITAL ENCOUNTER (EMERGENCY)
Age: 41
Discharge: HOME | End: 2021-06-08
Attending: STUDENT IN AN ORGANIZED HEALTH CARE EDUCATION/TRAINING PROGRAM
Payer: MEDICAID

## 2021-06-08 VITALS
OXYGEN SATURATION: 100 % | WEIGHT: 290 LBS | RESPIRATION RATE: 17 BRPM | HEIGHT: 67 IN | SYSTOLIC BLOOD PRESSURE: 142 MMHG | HEART RATE: 95 BPM | TEMPERATURE: 98.8 F | BODY MASS INDEX: 45.52 KG/M2 | DIASTOLIC BLOOD PRESSURE: 96 MMHG

## 2021-06-08 DIAGNOSIS — R30.0 DYSURIA: Primary | ICD-10-CM

## 2021-06-08 DIAGNOSIS — R10.2 PELVIC PAIN IN FEMALE: ICD-10-CM

## 2021-06-08 LAB
ALBUMIN SERPL-MCNC: 3.4 G/DL (ref 3.4–5)
ALBUMIN/GLOB SERPL: 0.7 {RATIO} (ref 0.8–1.7)
ALP SERPL-CCNC: 124 U/L (ref 45–117)
ALT SERPL-CCNC: 43 U/L (ref 13–56)
ANION GAP SERPL CALC-SCNC: 9 MMOL/L (ref 3–18)
APPEARANCE UR: CLEAR
AST SERPL-CCNC: 28 U/L (ref 10–38)
ATRIAL RATE: 109 BPM
BACTERIA URNS QL MICRO: ABNORMAL /HPF
BASOPHILS # BLD: 0 K/UL (ref 0–0.1)
BASOPHILS NFR BLD: 0 % (ref 0–2)
BILIRUB SERPL-MCNC: 0.3 MG/DL (ref 0.2–1)
BILIRUB UR QL: NEGATIVE
BUN SERPL-MCNC: 7 MG/DL (ref 7–18)
BUN/CREAT SERPL: 12 (ref 12–20)
CALCIUM SERPL-MCNC: 9.9 MG/DL (ref 8.5–10.1)
CALCULATED P AXIS, ECG09: 27 DEGREES
CALCULATED R AXIS, ECG10: 61 DEGREES
CALCULATED T AXIS, ECG11: 40 DEGREES
CHLORIDE SERPL-SCNC: 106 MMOL/L (ref 100–111)
CO2 SERPL-SCNC: 24 MMOL/L (ref 21–32)
COLOR UR: YELLOW
CREAT SERPL-MCNC: 0.59 MG/DL (ref 0.6–1.3)
DIAGNOSIS, 93000: NORMAL
DIFFERENTIAL METHOD BLD: ABNORMAL
EOSINOPHIL # BLD: 0.1 K/UL (ref 0–0.4)
EOSINOPHIL NFR BLD: 1 % (ref 0–5)
EPITH CASTS URNS QL MICRO: ABNORMAL /LPF (ref 0–5)
ERYTHROCYTE [DISTWIDTH] IN BLOOD BY AUTOMATED COUNT: 18 % (ref 11.6–14.5)
GLOBULIN SER CALC-MCNC: 5 G/DL (ref 2–4)
GLUCOSE SERPL-MCNC: 158 MG/DL (ref 74–99)
GLUCOSE UR STRIP.AUTO-MCNC: NEGATIVE MG/DL
HCG SERPL QL: NEGATIVE
HCT VFR BLD AUTO: 38.2 % (ref 35–45)
HGB BLD-MCNC: 11.6 G/DL (ref 12–16)
HGB UR QL STRIP: ABNORMAL
KETONES UR QL STRIP.AUTO: NEGATIVE MG/DL
LEUKOCYTE ESTERASE UR QL STRIP.AUTO: NEGATIVE
LIPASE SERPL-CCNC: 108 U/L (ref 73–393)
LYMPHOCYTES # BLD: 2.2 K/UL (ref 0.9–3.6)
LYMPHOCYTES NFR BLD: 18 % (ref 21–52)
MCH RBC QN AUTO: 20.6 PG (ref 24–34)
MCHC RBC AUTO-ENTMCNC: 30.4 G/DL (ref 31–37)
MCV RBC AUTO: 68 FL (ref 74–97)
MONOCYTES # BLD: 0.4 K/UL (ref 0.05–1.2)
MONOCYTES NFR BLD: 3 % (ref 3–10)
NEUTS SEG # BLD: 9.7 K/UL (ref 1.8–8)
NEUTS SEG NFR BLD: 78 % (ref 40–73)
NITRITE UR QL STRIP.AUTO: NEGATIVE
P-R INTERVAL, ECG05: 144 MS
PH UR STRIP: 5.5 [PH] (ref 5–8)
PLATELET # BLD AUTO: 414 K/UL (ref 135–420)
PLATELET COMMENTS,PCOM: ABNORMAL
PMV BLD AUTO: 9.7 FL (ref 9.2–11.8)
POTASSIUM SERPL-SCNC: 3.5 MMOL/L (ref 3.5–5.5)
PROT SERPL-MCNC: 8.4 G/DL (ref 6.4–8.2)
PROT UR STRIP-MCNC: 30 MG/DL
Q-T INTERVAL, ECG07: 322 MS
QRS DURATION, ECG06: 76 MS
QTC CALCULATION (BEZET), ECG08: 433 MS
RBC # BLD AUTO: 5.62 M/UL (ref 4.2–5.3)
RBC #/AREA URNS HPF: ABNORMAL /HPF (ref 0–5)
RBC MORPH BLD: ABNORMAL
SODIUM SERPL-SCNC: 139 MMOL/L (ref 136–145)
SP GR UR REFRACTOMETRY: 1.02 (ref 1–1.03)
TROPONIN I SERPL-MCNC: <0.02 NG/ML (ref 0–0.04)
UROBILINOGEN UR QL STRIP.AUTO: 1 EU/DL (ref 0.2–1)
VENTRICULAR RATE, ECG03: 109 BPM
WBC # BLD AUTO: 12.4 K/UL (ref 4.6–13.2)
WBC URNS QL MICRO: ABNORMAL /HPF (ref 0–5)

## 2021-06-08 PROCEDURE — 87086 URINE CULTURE/COLONY COUNT: CPT

## 2021-06-08 PROCEDURE — 84703 CHORIONIC GONADOTROPIN ASSAY: CPT

## 2021-06-08 PROCEDURE — 80053 COMPREHEN METABOLIC PANEL: CPT

## 2021-06-08 PROCEDURE — 85025 COMPLETE CBC W/AUTO DIFF WBC: CPT

## 2021-06-08 PROCEDURE — 99284 EMERGENCY DEPT VISIT MOD MDM: CPT

## 2021-06-08 PROCEDURE — 93005 ELECTROCARDIOGRAM TRACING: CPT

## 2021-06-08 PROCEDURE — 83690 ASSAY OF LIPASE: CPT

## 2021-06-08 PROCEDURE — 84484 ASSAY OF TROPONIN QUANT: CPT

## 2021-06-08 PROCEDURE — 81001 URINALYSIS AUTO W/SCOPE: CPT

## 2021-06-08 RX ORDER — OXYCODONE AND ACETAMINOPHEN 5; 325 MG/1; MG/1
1 TABLET ORAL
Qty: 6 TABLET | Refills: 0 | Status: SHIPPED | OUTPATIENT
Start: 2021-06-08 | End: 2021-06-11

## 2021-06-08 RX ORDER — PHENAZOPYRIDINE HYDROCHLORIDE 200 MG/1
200 TABLET, FILM COATED ORAL 3 TIMES DAILY
Qty: 6 TABLET | Refills: 0 | Status: SHIPPED | OUTPATIENT
Start: 2021-06-08 | End: 2021-06-10

## 2021-06-08 NOTE — ED TRIAGE NOTES
Patient states abdominal pain since yesterday with nausea. Denies vomiting, constipation diarrhea, vaginal discharge.

## 2021-06-08 NOTE — ED PROVIDER NOTES
EMERGENCY DEPARTMENT HISTORY AND PHYSICAL EXAM    Date: 2021  Patient Name: Rodolfo Delgadillo    History of Presenting Illness     Chief Complaint   Patient presents with    Abdominal Pain         History Provided By: Patient    Additional History (Context): Rodolfo Delgadillo is a 47 y.o. female with hypertension and obesity who presents with suprapubic abd pain and nausea x 2d. Denies melena, hematochezia, dysuria. H/o BTL. Denies flank pain or fever. Has had urinary frequency. Denies concern for STI and does not have vaginal discharge. Says her pelvic pain is chronic just prior to her cycles and should start tomorrow. H/o uterine fibroids. Followed by Dr. Mack Car in [place] for GYN. PCP: Tamera Manning, DO    Current Outpatient Medications   Medication Sig Dispense Refill    losartan-hydroCHLOROthiazide (HYZAAR) 100-12.5 mg per tablet take 1 tablet by mouth once daily for blood pressure      ferrous sulfate 325 mg (65 mg iron) tablet Take 1 Tab by mouth daily (with breakfast). 30 Tab 0    atorvastatin (LIPITOR) 10 mg tablet Take 1 Tab by mouth nightly. Indications: excessive fat in the blood 30 Tab 3       Past History     Past Medical History:  Past Medical History:   Diagnosis Date    Anemia, iron deficiency     Generalized headaches     Hypertension     NO CURRENT MEDS    Leiomyoma of uterus, unspecified        Past Surgical History:  Past Surgical History:   Procedure Laterality Date    HX  SECTION      times 2    HX CHOLECYSTECTOMY      HX TUBAL LIGATION      IR OCCL TXCATH ORGAN W SI         Family History:  Family History   Problem Relation Age of Onset    Diabetes Father     Hypertension Father     Diabetes Maternal Grandmother        Social History:  Social History     Tobacco Use    Smoking status: Never Smoker    Smokeless tobacco: Never Used   Substance Use Topics    Alcohol use:  Yes    Drug use: No       Allergies:  No Known Allergies      Review of Systems   Review of Systems   Constitutional: Negative. Negative for fever. HENT: Negative. Eyes: Negative. Respiratory: Negative. Cardiovascular: Negative. Gastrointestinal: Positive for nausea. Negative for blood in stool and vomiting. Endocrine: Negative. Genitourinary: Positive for pelvic pain. Musculoskeletal: Negative. Skin: Negative. Allergic/Immunologic: Negative. Neurological: Negative. Hematological: Negative. Psychiatric/Behavioral: Negative. All Other Systems Negative  Physical Exam     Vitals:    06/08/21 1531   BP: (!) 169/115   Pulse: (!) 114   Resp: 18   Temp: 98.3 °F (36.8 °C)   SpO2: 97%   Weight: 131.5 kg (290 lb)   Height: 5' 7\" (1.702 m)     Physical Exam  Vitals and nursing note reviewed. Constitutional:       Appearance: She is well-developed. HENT:      Head: Normocephalic and atraumatic. Right Ear: External ear normal.      Left Ear: External ear normal.      Nose: Nose normal.   Eyes:      Conjunctiva/sclera: Conjunctivae normal.      Pupils: Pupils are equal, round, and reactive to light. Neck:      Vascular: No JVD. Trachea: No tracheal deviation. Cardiovascular:      Rate and Rhythm: Normal rate and regular rhythm. Heart sounds: Normal heart sounds. No murmur heard. No friction rub. No gallop. Pulmonary:      Effort: Pulmonary effort is normal. No respiratory distress. Breath sounds: Normal breath sounds. No wheezing or rales. Abdominal:      General: Bowel sounds are normal. There is no distension. Palpations: Abdomen is soft. There is no mass. Tenderness: There is no abdominal tenderness. There is no guarding or rebound. Musculoskeletal:         General: No tenderness. Normal range of motion. Cervical back: Normal range of motion and neck supple. Skin:     General: Skin is warm and dry. Findings: No rash.    Neurological:      Mental Status: She is alert and oriented to person, place, and time. Cranial Nerves: No cranial nerve deficit. Deep Tendon Reflexes: Reflexes are normal and symmetric. Psychiatric:         Behavior: Behavior normal.            Diagnostic Study Results     Labs -     Recent Results (from the past 12 hour(s))   EKG, 12 LEAD, INITIAL    Collection Time: 06/08/21  3:39 PM   Result Value Ref Range    Ventricular Rate 109 BPM    Atrial Rate 109 BPM    P-R Interval 144 ms    QRS Duration 76 ms    Q-T Interval 322 ms    QTC Calculation (Bezet) 433 ms    Calculated P Axis 27 degrees    Calculated R Axis 61 degrees    Calculated T Axis 40 degrees    Diagnosis       Sinus tachycardia  Nonspecific ST and T wave abnormality  Abnormal ECG  When compared with ECG of 17-APR-2021 18:18,  No significant change was found  Confirmed by Daniela Breaux MD, ----- ([id number]) on 6/8/2021 4:03:27 PM     CBC WITH AUTOMATED DIFF    Collection Time: 06/08/21  3:47 PM   Result Value Ref Range    WBC 12.4 4.6 - 13.2 K/uL    RBC 5.62 (H) 4.20 - 5.30 M/uL    HGB 11.6 (L) 12.0 - 16.0 g/dL    HCT 38.2 35.0 - 45.0 %    MCV 68.0 (L) 74.0 - 97.0 FL    MCH 20.6 (L) 24.0 - 34.0 PG    MCHC 30.4 (L) 31.0 - 37.0 g/dL    RDW 18.0 (H) 11.6 - 14.5 %    PLATELET 381 279 - 215 K/uL    MPV 9.7 9.2 - 11.8 FL    NEUTROPHILS PENDING %    LYMPHOCYTES PENDING %    MONOCYTES PENDING %    EOSINOPHILS PENDING %    BASOPHILS PENDING %    ABS. NEUTROPHILS PENDING K/UL    ABS. LYMPHOCYTES PENDING K/UL    ABS. MONOCYTES PENDING K/UL    ABS. EOSINOPHILS PENDING K/UL    ABS. BASOPHILS PENDING K/UL    DF PENDING        Radiologic Studies -   No orders to display     CT Results  (Last 48 hours)    None        CXR Results  (Last 48 hours)    None            Medical Decision Making   I am the first provider for this patient. I reviewed the vital signs, available nursing notes, past medical history, past surgical history, family history and social history.     Vital Signs-Reviewed the patient's vital signs.      Procedures:  Procedures    Provider Notes (Medical Decision Making): no real lower abd reproducible TTP. H/o fibroids, ready to have her cycle tomorrow. Has ibuprofen at home and will write for a few Percocet and treat her urinary symptoms, send urine for culture. Advised f/up with her GYN MD.    MED RECONCILIATION:  No current facility-administered medications for this encounter. Current Outpatient Medications   Medication Sig    losartan-hydroCHLOROthiazide (HYZAAR) 100-12.5 mg per tablet take 1 tablet by mouth once daily for blood pressure    ferrous sulfate 325 mg (65 mg iron) tablet Take 1 Tab by mouth daily (with breakfast).  atorvastatin (LIPITOR) 10 mg tablet Take 1 Tab by mouth nightly. Indications: excessive fat in the blood       Disposition:  home    DISCHARGE NOTE:   4:53 PM    Pt has been reexamined. Patient has no new complaints, changes, or physical findings. Care plan outlined and precautions discussed. Results of labs were reviewed with the patient. All medications were reviewed with the patient; will d/c home with pyridium, percocet. All of pt's questions and concerns were addressed. Patient was instructed and agrees to follow up with GYN, PCP, as well as to return to the ED upon further deterioration. Patient is ready to go home. Follow-up Information    None         Current Discharge Medication List          Diagnosis     Clinical Impression: No diagnosis found.

## 2021-06-08 NOTE — ED NOTES
I performed a brief evaluation, including history and physical, of the patient here in triage and I have determined that pt will need further treatment and evaluation from the main side ER physician. I have placed initial orders to help in expediting patients care.      June 08, 2021 at 3:30 PM - ZACK Herman

## 2021-06-10 LAB
BACTERIA SPEC CULT: NORMAL
CC UR VC: NORMAL
SERVICE CMNT-IMP: NORMAL

## 2022-09-26 ENCOUNTER — HOSPITAL ENCOUNTER (OUTPATIENT)
Dept: LAB | Age: 42
Discharge: HOME | End: 2022-09-26

## 2022-09-26 ENCOUNTER — TRANSCRIBE ORDER (OUTPATIENT)
Dept: REGISTRATION | Age: 42
End: 2022-09-26

## 2022-09-26 DIAGNOSIS — Z01.818 PRE-OP TESTING: Primary | ICD-10-CM

## 2022-09-26 DIAGNOSIS — Z01.818 PRE-OP TESTING: ICD-10-CM

## 2022-09-26 LAB — SENTARA SPECIMEN COL,SENBCF: NORMAL

## 2022-09-26 PROCEDURE — 99001 SPECIMEN HANDLING PT-LAB: CPT
